# Patient Record
Sex: FEMALE | Race: WHITE | NOT HISPANIC OR LATINO | Employment: STUDENT | URBAN - METROPOLITAN AREA
[De-identification: names, ages, dates, MRNs, and addresses within clinical notes are randomized per-mention and may not be internally consistent; named-entity substitution may affect disease eponyms.]

---
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
## 2021-05-23 ENCOUNTER — OFFICE VISIT (OUTPATIENT)
Dept: URGENT CARE | Facility: CLINIC | Age: 21
End: 2021-05-23
Payer: COMMERCIAL

## 2021-05-23 VITALS
HEART RATE: 71 BPM | SYSTOLIC BLOOD PRESSURE: 117 MMHG | TEMPERATURE: 97.8 F | RESPIRATION RATE: 18 BRPM | HEIGHT: 68 IN | WEIGHT: 168 LBS | DIASTOLIC BLOOD PRESSURE: 66 MMHG | BODY MASS INDEX: 25.46 KG/M2 | OXYGEN SATURATION: 98 %

## 2021-05-23 DIAGNOSIS — R42 VERTIGO: ICD-10-CM

## 2021-05-23 DIAGNOSIS — K21.9 GASTROESOPHAGEAL REFLUX DISEASE, UNSPECIFIED WHETHER ESOPHAGITIS PRESENT: ICD-10-CM

## 2021-05-23 DIAGNOSIS — H69.91 DISORDER OF RIGHT EUSTACHIAN TUBE: ICD-10-CM

## 2021-05-23 DIAGNOSIS — R42 DIZZINESS AND GIDDINESS: Primary | ICD-10-CM

## 2021-05-23 PROCEDURE — 99213 OFFICE O/P EST LOW 20 MIN: CPT | Performed by: NURSE PRACTITIONER

## 2021-05-23 RX ORDER — MECLIZINE HYDROCHLORIDE 25 MG/1
25 TABLET ORAL 3 TIMES DAILY PRN
Qty: 20 TABLET | Refills: 0 | Status: SHIPPED | OUTPATIENT
Start: 2021-05-23

## 2021-05-23 RX ORDER — DROSPIRENONE AND ETHINYL ESTRADIOL 0.02-3(28)
1 KIT ORAL DAILY
COMMUNITY

## 2021-05-23 NOTE — PATIENT INSTRUCTIONS
--Use OTC nasal steroid (Flonase, Nasocort) at bedtime  Consider also 3-days of prednisone  --Rx meclizine as needed for vertigo  Use with caution as may cause tiredness  --Stay well hydrated, change positions slowly  --Avoid alcohol, greasy foods, heavy meals  OTC Prilosec or Pepcid as needed for heartburn  --F/u with PCP for ongoing symptoms  Go to ER if worse

## 2021-05-23 NOTE — PROGRESS NOTES
[facility]        NAME: Mikey Antonio is a 24 y o  female  : 2000    MRN: [id number]  DATE: May 23, 2021  TIME: 1:02 PM    Assessment and Plan   Dizziness and giddiness [R42]  1  Dizziness and giddiness     2  Vertigo  meclizine (ANTIVERT) 25 mg tablet   3  Disorder of right eustachian tube     4  Gastroesophageal reflux disease, unspecified whether esophagitis present       --Suspect labyrinthitis/PV secondary to underlying allergic rhinitis/ETD as the most likely cause of her symptoms  Address per below  Patient Instructions     --Use OTC nasal steroid (Flonase, Nasocort) at bedtime  Consider also 3-days of prednisone (mom has at home)  --Rx meclizine as needed for vertigo  Use with caution as may cause tiredness  --Stay well hydrated, change positions slowly  --Avoid alcohol, greasy foods, heavy meals  OTC Prilosec or Pepcid as needed for heartburn  --F/u with PCP for ongoing symptoms  Go to ER if worse  Chief Complaint     Chief Complaint   Patient presents with    Dizziness     Random vomiting and dizziness x 3 weeks  Rt ear pain started yesterday  [place] teeth coming in  Advil taken  History of Present Illness         Here with mom for complaints of intermittent dizziness, full/"weird" feeling in right ear x 10 days  Dizziness is combination of lightheaded, spinning sensation  Noted more with position changes  Admits to spring allergies  Some sneezing, but no significant nasal congestion at present  No fever, headache  No hearing changes  No weakness, numbness, vision changes, neck pain, falls  No associated nausea, but did have episode of vomiting 4 weeks ago and again 2 days ago, both times  after eating greasy food and consuming single alcoholic beverage  No abdominal pain but does have history of heartburn and hiatal hernia  No nausea at present  Mild dizziness, however  Slated to get wisdom teeth out soon           Review of Systems   Review of Systems   Constitutional: Negative for fever  HENT: Positive for ear pain  Negative for ear discharge, hearing loss and sore throat  Respiratory: Negative for shortness of breath  Cardiovascular: Negative for chest pain  Gastrointestinal: Positive for vomiting  Negative for abdominal pain and nausea  Genitourinary: Negative for difficulty urinating  Musculoskeletal: Negative for neck pain  Skin: Negative for rash  Neurological: Positive for dizziness and light-headedness  Negative for tremors, syncope, speech difficulty, numbness and headaches  Psychiatric/Behavioral: Negative for confusion  Current Medications       Current Outpatient Medications:     drospirenone-ethinyl estradiol (SANJUANITA) 3-0 02 MG per tablet, Take 1 tablet by mouth daily, Disp: , Rfl:     methylphenidate (RITALIN) 20 MG tablet, Take by mouth, Disp: , Rfl:     Doxycycline Hyclate 200 MG TBEC, Take by mouth, Disp: , Rfl:     meclizine (ANTIVERT) 25 mg tablet, Take 1 tablet (25 mg total) by mouth 3 (three) times a day as needed for dizziness, Disp: 20 tablet, Rfl: 0    Current Allergies     Allergies as of 05/23/2021    (No Known Allergies)            The following portions of the patient's history were reviewed and updated as appropriate: allergies, current medications, past family history, past medical history, past social history, past surgical history and problem list      Past Medical History:   Diagnosis Date    Acid reflux        History reviewed  No pertinent surgical history  No family history on file  Medications have been verified  Objective   /66   Pulse 71   Temp 97 8 °F (36 6 °C) (Temporal)   Resp 18   Ht 5' 8" (1 727 m)   Wt 76 2 kg (168 lb)   LMP 05/15/2021   SpO2 98%   BMI 25 54 kg/m²   Patient's last menstrual period was 05/15/2021  Physical Exam     Physical Exam  Constitutional:       Appearance: She is well-developed  HENT:      Head: Normocephalic        Right Ear: Ear canal and external ear normal       Left Ear: Tympanic membrane, ear canal and external ear normal       Ears:      Comments: Right TM dull grey, partially retracted  Nose: Nose normal       Mouth/Throat:      Pharynx: No posterior oropharyngeal erythema  Eyes:      Extraocular Movements: Extraocular movements intact  Conjunctiva/sclera: Conjunctivae normal       Pupils: Pupils are equal, round, and reactive to light  Comments: No nystagmus  Neck:      Musculoskeletal: Normal range of motion and neck supple  Cardiovascular:      Rate and Rhythm: Normal rate and regular rhythm  Heart sounds: Normal heart sounds  Pulmonary:      Effort: Pulmonary effort is normal       Breath sounds: Normal breath sounds  Abdominal:      General: Bowel sounds are normal       Palpations: Abdomen is soft  Tenderness: There is no abdominal tenderness  Musculoskeletal: Normal range of motion  General: No tenderness  Lymphadenopathy:      Cervical: No cervical adenopathy  Skin:     General: Skin is warm and dry  Neurological:      General: No focal deficit present  Mental Status: She is alert and oriented to person, place, and time  Cranial Nerves: No cranial nerve deficit  Sensory: No sensory deficit  Motor: No weakness  Coordination: Coordination normal       Gait: Gait normal       Deep Tendon Reflexes: Reflexes are normal and symmetric  Reflexes normal       Comments: Equivocal [place]  Psychiatric:         Mood and Affect: Mood normal          Behavior: Behavior normal          Thought Content:  Thought content normal          Judgment: Judgment normal

## 2021-06-29 ENCOUNTER — OFFICE VISIT (OUTPATIENT)
Dept: URGENT CARE | Age: 21
End: 2021-06-29
Payer: COMMERCIAL

## 2021-06-29 VITALS
TEMPERATURE: 97.3 F | SYSTOLIC BLOOD PRESSURE: 105 MMHG | RESPIRATION RATE: 18 BRPM | DIASTOLIC BLOOD PRESSURE: 59 MMHG | HEART RATE: 75 BPM | OXYGEN SATURATION: 99 %

## 2021-06-29 DIAGNOSIS — L25.5 DERMATITIS DUE TO PLANTS, INCLUDING POISON IVY, SUMAC, AND OAK: Primary | ICD-10-CM

## 2021-06-29 PROCEDURE — 99213 OFFICE O/P EST LOW 20 MIN: CPT | Performed by: PHYSICIAN ASSISTANT

## 2021-06-29 RX ORDER — PREDNISONE 10 MG/1
TABLET ORAL
Qty: 21 TABLET | Refills: 0 | Status: SHIPPED | OUTPATIENT
Start: 2021-06-29 | End: 2021-07-05

## 2021-06-29 NOTE — PATIENT INSTRUCTIONS
Take all prednisone as prescribed  Do Not take any other anti-inflammatories such as Motrin, Aleve, ibuprofen with the prednisone/steroids  OTC symptomatic treatment for itching if needed  Call PCP to schedule a follow-up appt tomorrow for reevaluation and to ensure resolution of symptoms  Go to the nearest ER for evaluation if any fevers, redness, warmth, discharge, streaking redness, redness that is circumferential, bleeding, pain, signs of infection, new or worsening symptoms, facial/tongue/lip swelling, vision changes, hearing changes, ear drainage, difficulty breathing or swallowing, or other new worsening or concerning symptoms  Poison Ivy   WHAT YOU NEED TO KNOW:   Poison ivy is a plant that can cause an itchy, uncomfortable rash on your skin  Poison ivy grows as a shrub or vine in woods, fields, and areas of thick [place]  It has 3 bright green leaves on each stem that turn red in zhao  DISCHARGE INSTRUCTIONS:   Medicines:   · Antiseptic or drying creams or ointments: These medicines may be used to dry out the rash and decrease the itching  These products may be available without a doctor's order  · Steroids: This medicine helps decrease itching and inflammation  It can be given as a cream to apply to your skin or as a pill  · Antihistamines: This medicine may help decrease itching and help you sleep  It is available without a doctor's order  · Take your medicine as directed  Contact your healthcare provider if you think your medicine is not helping or if you have side effects  Tell him of her if you are allergic to any medicine  Keep a list of the medicines, vitamins, and herbs you take  Include the amounts, and when and why you take them  Bring the list or the pill bottles to follow-up visits  Carry your medicine list with you in case of an emergency      Follow up with your healthcare provider as directed:  Write down your questions so you remember to ask them during your visits  How your poison ivy rash spreads: You cannot spread poison ivy by touching your rash or the liquid from your blisters  Poison ivy is spread only if you scratch your skin while it still has oil on it  You may think your rash is spreading because new rashes appear over a number of days  This happens because areas covered by thin skin break out in a rash first  Your face or forearms may develop a rash before thicker areas, such as the palms of your hands  Self-care:   · Keep your rash clean and dry:  Wash it with soap and water  Gently pat it dry with a clean towel  · Try not to scratch or rub your rash: This can cause your skin to become infected  · Use a compress on your rash:  Dip a clean washcloth in cool water  Wring it out and place it on your rash  Leave the washcloth on your skin for 15 minutes  Do this at least 3 times per day  · Take a cornstarch or oatmeal bath: If your rash is too large to cover with wet washcloths, take 3 or 4 cornstarch baths daily  Mix 1 pound of cornstarch with a little water to make a paste  Add the paste to a tub full of water and mix well  You may also use colloidal oatmeal in the bath water  Use lukewarm water  Avoid hot water because it may cause your itching to increase  Prevent a poison ivy rash in the future:   · Wear skin protection:  Wear long pants, a long-sleeved shirt, and gloves  Use a skin block lotion to protect your skin from poison ivy oil  You can find this at a drugstore without a prescription  · Wash clothing after possible exposure: If you think you have been near a poison ivy plant, wash the clothes you were wearing separately from other clothes  Rinse the washing machine well after you take the clothes out  Scrub boots and shoes with warm, soapy water  Dry clean items and clothing that you cannot wash in water  Poison ivy oil is sticky and can stay on surfaces for a long time  It can cause a new rash even years later  · Bathe your pet:  Use warm water and shampoo on your pet's fur  This will prevent the spread of oil to your skin, car, and home  Wear long sleeves, long pants, and gloves while washing pets or any items that may have oil on them  · Reduce exposure to poison ivy:  Do not touch plants that look like poison ivy  Keep your yard free of poison ivy  While protecting your skin, remove the plant and the roots  Place them in a plastic bag and seal the bag tightly  · Do not burn poison ivy plants: This can spread the oil through the air  If you breathe the oil into your lungs, you could have swelling and serious breathing problems  Oil that clings to the fire wilder can land on your skin and cause a rash  Contact your healthcare provider if:   · You have pus, soft yellow scabs, or tenderness on the rash  · The itching gets worse or keeps you awake at night  · The rash covers more than 1/4 of your skin or spreads to your eyes, mouth, or genital area  · The rash is not better after 2 to 3 weeks  · You have tender, swollen glands on the sides of your neck  · You have swelling in your arms and legs  · You have questions or concerns about your condition or care  Return to the emergency department if:   · You have a fever  · You have redness, swelling, and tenderness around the rash  · You have trouble breathing  © Copyright [address] Information is for End User's use only and may not be sold, redistributed or otherwise used for commercial purposes  All illustrations and images included in CareNotes® are the copyrighted property of A D A M , Inc  or [facility]   The above information is an  only  It is not intended as medical advice for individual conditions or treatments  Talk to your doctor, nurse or pharmacist before following any medical regimen to see if it is safe and effective for you

## 2021-06-29 NOTE — PROGRESS NOTES
[facility]        NAME: Damian Rowell is a 24 y o  female  : 2000    MRN: [id number]  DATE: 2021  TIME: 8:39 AM    Assessment and Plan   Dermatitis due to plants, including poison ivy, sumac, and oak [L25 5]  1  Dermatitis due to plants, including poison ivy, sumac, and oak  predniSONE 10 mg tablet         Patient Instructions     Take all prednisone as prescribed  Do Not take any other anti-inflammatories such as Motrin, Aleve, ibuprofen with the prednisone/steroids  OTC symptomatic treatment for itching if needed  Call PCP to schedule a follow-up appt tomorrow for reevaluation and to ensure resolution of symptoms  Go to the nearest ER for evaluation if any fevers, redness, warmth, discharge, streaking redness, redness that is circumferential, bleeding, pain, signs of infection, new or worsening symptoms, facial/tongue/lip swelling, vision changes, hearing changes, ear drainage, difficulty breathing or swallowing, or other new worsening or concerning symptoms  Chief Complaint     Chief Complaint   Patient presents with   [address]     face x yesterday         History of Present Illness        25-year-old female presents with poison ivy rash on left thigh, right forehead, bilateral cheeks since yesterday  States on Saturday and  she was at her boyfriend's house and they were outside and they noticed some poison ivy  It seems like her previous poison ivy rashes in the past   States she tried the over-the-counter topical creams with no relief  States her boyfriend has been on prednisone in the past for a similar rash from poison ivy  She denies any fevers, cough/ cold symptoms, drainage, warmth, difficulty breathing or swallowing,  eye pain, vision changes, ear drainage, hearing changes, facial swelling, tongue swelling, chest pain, shortness of breath, GI/ symptoms or other complaints    She denies any other new lotions, foods, detergents, soaps, bug bites or other inciting factors  She denies any pregnancy risk  Review of Systems   Review of Systems   Constitutional: Negative for chills, fatigue and fever  HENT: Negative for congestion, ear discharge, ear pain, facial swelling, hearing loss, mouth sores, sinus pressure, sore throat, trouble swallowing and voice change  Respiratory: Negative for cough, chest tightness, shortness of breath and wheezing  Cardiovascular: Negative for chest pain  Gastrointestinal: Negative for abdominal pain, nausea and vomiting  Musculoskeletal: Negative for arthralgias, myalgias and neck pain  Skin: Positive for rash  Neurological: Negative for dizziness, syncope, weakness, numbness and headaches  All other systems reviewed and are negative          Current Medications       Current Outpatient Medications:     drospirenone-ethinyl estradiol (SANJUANITA) 3-0 02 MG per tablet, Take 1 tablet by mouth daily, Disp: , Rfl:     Doxycycline Hyclate 200 MG TBEC, Take by mouth (Patient not taking: Reported on 6/29/2021), Disp: , Rfl:     meclizine (ANTIVERT) 25 mg tablet, Take 1 tablet (25 mg total) by mouth 3 (three) times a day as needed for dizziness (Patient not taking: Reported on 6/29/2021), Disp: 20 tablet, Rfl: 0    methylphenidate (RITALIN) 20 MG tablet, Take by mouth (Patient not taking: Reported on 6/29/2021), Disp: , Rfl:     predniSONE 10 mg tablet, Take 6 tablets (60 mg total) by mouth daily for 1 day, THEN 5 tablets (50 mg total) daily for 1 day, THEN 4 tablets (40 mg total) daily for 1 day, THEN 3 tablets (30 mg total) daily for 1 day, THEN 2 tablets (20 mg total) daily for 1 day, THEN 1 tablet (10 mg total) daily for 1 day , Disp: 21 tablet, Rfl: 0    Current Allergies     Allergies as of 06/29/2021    (No Known Allergies)            The following portions of the patient's history were reviewed and updated as appropriate: allergies, current medications, past family history, past medical history, past social history, past surgical history and problem list      Past Medical History:   Diagnosis Date    Acid reflux        History reviewed  No pertinent surgical history  No family history on file  Medications have been verified  Objective   /59   Pulse 75   Temp (!) 97 3 °F (36 3 °C)   Resp 18   LMP 06/08/2021   SpO2 99%        Physical Exam     Physical Exam  Vitals and nursing note reviewed  Constitutional:       General: She is not in acute distress  Appearance: She is well-developed  HENT:      Head:        Right Ear: Hearing, tympanic membrane, ear canal and external ear normal       Left Ear: Hearing, tympanic membrane, ear canal and external ear normal       Mouth/Throat:      Mouth: Mucous membranes are moist  No oral lesions or angioedema  Pharynx: Oropharynx is clear  Uvula midline  No pharyngeal swelling, posterior oropharyngeal erythema or uvula swelling  Eyes:      Extraocular Movements: Extraocular movements intact  Conjunctiva/sclera: Conjunctivae normal       Pupils: Pupils are equal, round, and reactive to light  Cardiovascular:      Rate and Rhythm: Normal rate and regular rhythm  Heart sounds: Normal heart sounds  Pulmonary:      Effort: Pulmonary effort is normal  No respiratory distress  Breath sounds: Normal breath sounds  No wheezing  Skin:     Capillary Refill: Capillary refill takes less than 2 seconds  Findings: Rash ( blanchable mildly erythematous rash to right forehead, bilateral cheeks, left thigh consistent with poison ivy /oak dermatitis rash  No pustules, ulcerations  No drainage or warmth  NTTP  No swelling  No sign of secondary infection or cellulitis) present  Neurological:      Mental Status: She is alert and oriented to person, place, and time     Psychiatric:         Behavior: Behavior normal

## 2022-04-22 ENCOUNTER — APPOINTMENT (OUTPATIENT)
Dept: LAB | Facility: MEDICAL CENTER | Age: 22
End: 2022-04-22

## 2022-08-08 ENCOUNTER — ANESTHESIA EVENT (OUTPATIENT)
Dept: PERIOP | Facility: HOSPITAL | Age: 22
End: 2022-08-08
Payer: SELF-PAY

## 2022-08-11 NOTE — PRE-PROCEDURE INSTRUCTIONS
Pre-Surgery Instructions:   Medication Instructions    drospirenone-ethinyl estradiol (SANJUANITA) 3-0 02 MG per tablet Take per normal schedule     Pre op,medications and showering instructions reviewed-Patient has hibiclens  Instructed to avoid all ASA/NSAIDs and OTC Vit/Supp from now until after surgery per anesthesia guidelines  Tylenol ok prn  Pt  Verbalized an understanding of all instructions reviewed and offers no concerns at this time

## 2022-08-15 ENCOUNTER — ANESTHESIA (OUTPATIENT)
Dept: PERIOP | Facility: HOSPITAL | Age: 22
End: 2022-08-15
Payer: SELF-PAY

## 2022-08-15 ENCOUNTER — HOSPITAL ENCOUNTER (OUTPATIENT)
Facility: HOSPITAL | Age: 22
Setting detail: OUTPATIENT SURGERY
Discharge: HOME | End: 2022-08-15
Attending: SURGERY | Admitting: SURGERY
Payer: SELF-PAY

## 2022-08-15 VITALS
RESPIRATION RATE: 18 BRPM | DIASTOLIC BLOOD PRESSURE: 76 MMHG | BODY MASS INDEX: 28.79 KG/M2 | HEIGHT: 68 IN | SYSTOLIC BLOOD PRESSURE: 129 MMHG | TEMPERATURE: 96.9 F | WEIGHT: 190 LBS | OXYGEN SATURATION: 95 % | HEART RATE: 80 BPM

## 2022-08-15 LAB
EXT PREGNANCY TEST URINE: NEGATIVE
EXT. CONTROL: NORMAL

## 2022-08-15 PROCEDURE — 81025 URINE PREGNANCY TEST: CPT | Performed by: SURGERY

## 2022-08-15 PROCEDURE — L8600 IMPLANT BREAST SILICONE/EQ: HCPCS | Performed by: SURGERY

## 2022-08-15 DEVICE — SMOOTH ROUND MODERATE PLUS PROFILE GEL-FILLED BREAST IMPLANT, 350CC  SMOOTH ROUND SILICONE
Type: IMPLANTABLE DEVICE | Site: BREAST | Status: FUNCTIONAL
Brand: MENTOR MEMORYGEL BREAST IMPLANT

## 2022-08-15 RX ORDER — ONDANSETRON 2 MG/ML
4 INJECTION INTRAMUSCULAR; INTRAVENOUS ONCE AS NEEDED
Status: DISCONTINUED | OUTPATIENT
Start: 2022-08-15 | End: 2022-08-15 | Stop reason: HOSPADM

## 2022-08-15 RX ORDER — PROPOFOL 10 MG/ML
INJECTION, EMULSION INTRAVENOUS AS NEEDED
Status: DISCONTINUED | OUTPATIENT
Start: 2022-08-15 | End: 2022-08-15

## 2022-08-15 RX ORDER — FENTANYL CITRATE 50 UG/ML
INJECTION, SOLUTION INTRAMUSCULAR; INTRAVENOUS AS NEEDED
Status: DISCONTINUED | OUTPATIENT
Start: 2022-08-15 | End: 2022-08-15

## 2022-08-15 RX ORDER — SODIUM CHLORIDE, SODIUM LACTATE, POTASSIUM CHLORIDE, CALCIUM CHLORIDE 600; 310; 30; 20 MG/100ML; MG/100ML; MG/100ML; MG/100ML
125 INJECTION, SOLUTION INTRAVENOUS CONTINUOUS
Status: DISCONTINUED | OUTPATIENT
Start: 2022-08-15 | End: 2022-08-15 | Stop reason: HOSPADM

## 2022-08-15 RX ORDER — HYDROMORPHONE HCL/PF 1 MG/ML
SYRINGE (ML) INJECTION AS NEEDED
Status: DISCONTINUED | OUTPATIENT
Start: 2022-08-15 | End: 2022-08-15

## 2022-08-15 RX ORDER — MIDAZOLAM HYDROCHLORIDE 2 MG/2ML
INJECTION, SOLUTION INTRAMUSCULAR; INTRAVENOUS AS NEEDED
Status: DISCONTINUED | OUTPATIENT
Start: 2022-08-15 | End: 2022-08-15

## 2022-08-15 RX ORDER — HYDROCODONE BITARTRATE AND ACETAMINOPHEN 5; 325 MG/1; MG/1
1 TABLET ORAL EVERY 4 HOURS PRN
Status: DISCONTINUED | OUTPATIENT
Start: 2022-08-15 | End: 2022-08-15 | Stop reason: HOSPADM

## 2022-08-15 RX ORDER — HYDROMORPHONE HCL IN WATER/PF 6 MG/30 ML
0.2 PATIENT CONTROLLED ANALGESIA SYRINGE INTRAVENOUS
Status: DISCONTINUED | OUTPATIENT
Start: 2022-08-15 | End: 2022-08-15 | Stop reason: HOSPADM

## 2022-08-15 RX ORDER — GLYCOPYRROLATE 0.2 MG/ML
INJECTION INTRAMUSCULAR; INTRAVENOUS AS NEEDED
Status: DISCONTINUED | OUTPATIENT
Start: 2022-08-15 | End: 2022-08-15

## 2022-08-15 RX ORDER — LIDOCAINE HYDROCHLORIDE 10 MG/ML
INJECTION, SOLUTION EPIDURAL; INFILTRATION; INTRACAUDAL; PERINEURAL AS NEEDED
Status: DISCONTINUED | OUTPATIENT
Start: 2022-08-15 | End: 2022-08-15

## 2022-08-15 RX ORDER — MAGNESIUM HYDROXIDE 1200 MG/15ML
LIQUID ORAL AS NEEDED
Status: DISCONTINUED | OUTPATIENT
Start: 2022-08-15 | End: 2022-08-15 | Stop reason: HOSPADM

## 2022-08-15 RX ORDER — DEXAMETHASONE SODIUM PHOSPHATE 10 MG/ML
INJECTION, SOLUTION INTRAMUSCULAR; INTRAVENOUS AS NEEDED
Status: DISCONTINUED | OUTPATIENT
Start: 2022-08-15 | End: 2022-08-15

## 2022-08-15 RX ORDER — CEFAZOLIN SODIUM 2 G/50ML
2000 SOLUTION INTRAVENOUS ONCE
Status: COMPLETED | OUTPATIENT
Start: 2022-08-15 | End: 2022-08-15

## 2022-08-15 RX ORDER — FENTANYL CITRATE/PF 50 MCG/ML
50 SYRINGE (ML) INJECTION
Status: COMPLETED | OUTPATIENT
Start: 2022-08-15 | End: 2022-08-15

## 2022-08-15 RX ORDER — ROCURONIUM BROMIDE 10 MG/ML
INJECTION, SOLUTION INTRAVENOUS AS NEEDED
Status: DISCONTINUED | OUTPATIENT
Start: 2022-08-15 | End: 2022-08-15

## 2022-08-15 RX ORDER — ONDANSETRON 2 MG/ML
INJECTION INTRAMUSCULAR; INTRAVENOUS AS NEEDED
Status: DISCONTINUED | OUTPATIENT
Start: 2022-08-15 | End: 2022-08-15

## 2022-08-15 RX ORDER — ACETAMINOPHEN 325 MG/1
650 TABLET ORAL EVERY 6 HOURS PRN
Status: DISCONTINUED | OUTPATIENT
Start: 2022-08-15 | End: 2022-08-15 | Stop reason: HOSPADM

## 2022-08-15 RX ORDER — NEOSTIGMINE METHYLSULFATE 1 MG/ML
INJECTION INTRAVENOUS AS NEEDED
Status: DISCONTINUED | OUTPATIENT
Start: 2022-08-15 | End: 2022-08-15

## 2022-08-15 RX ADMIN — NEOSTIGMINE METHYLSULFATE 3 MG: 1 INJECTION INTRAVENOUS at 11:42

## 2022-08-15 RX ADMIN — FENTANYL CITRATE 50 MCG: 50 INJECTION, SOLUTION INTRAMUSCULAR; INTRAVENOUS at 12:33

## 2022-08-15 RX ADMIN — HYDROMORPHONE HYDROCHLORIDE 0.25 MG: 1 INJECTION, SOLUTION INTRAMUSCULAR; INTRAVENOUS; SUBCUTANEOUS at 10:22

## 2022-08-15 RX ADMIN — DEXAMETHASONE SODIUM PHOSPHATE 10 MG: 10 INJECTION INTRAMUSCULAR; INTRAVENOUS at 10:03

## 2022-08-15 RX ADMIN — HYDROCODONE BITARTRATE AND ACETAMINOPHEN 1 TABLET: 5; 325 TABLET ORAL at 13:42

## 2022-08-15 RX ADMIN — MIDAZOLAM HYDROCHLORIDE 2 MG: 1 INJECTION, SOLUTION INTRAMUSCULAR; INTRAVENOUS at 09:56

## 2022-08-15 RX ADMIN — GLYCOPYRROLATE 0.4 MG: 0.2 INJECTION, SOLUTION INTRAMUSCULAR; INTRAVENOUS at 11:42

## 2022-08-15 RX ADMIN — ROCURONIUM BROMIDE 10 MG: 10 INJECTION, SOLUTION INTRAVENOUS at 10:34

## 2022-08-15 RX ADMIN — PROPOFOL 200 MG: 10 INJECTION, EMULSION INTRAVENOUS at 09:59

## 2022-08-15 RX ADMIN — LIDOCAINE HYDROCHLORIDE 50 MG: 10 INJECTION, SOLUTION EPIDURAL; INFILTRATION; INTRACAUDAL; PERINEURAL at 09:59

## 2022-08-15 RX ADMIN — FENTANYL CITRATE 50 MCG: 50 INJECTION, SOLUTION INTRAMUSCULAR; INTRAVENOUS at 12:10

## 2022-08-15 RX ADMIN — ROCURONIUM BROMIDE 50 MG: 10 INJECTION, SOLUTION INTRAVENOUS at 09:59

## 2022-08-15 RX ADMIN — SODIUM CHLORIDE, SODIUM LACTATE, POTASSIUM CHLORIDE, AND CALCIUM CHLORIDE: .6; .31; .03; .02 INJECTION, SOLUTION INTRAVENOUS at 09:56

## 2022-08-15 RX ADMIN — FENTANYL CITRATE 50 MCG: 50 INJECTION, SOLUTION INTRAMUSCULAR; INTRAVENOUS at 12:25

## 2022-08-15 RX ADMIN — FENTANYL CITRATE 50 MCG: 50 INJECTION, SOLUTION INTRAMUSCULAR; INTRAVENOUS at 10:50

## 2022-08-15 RX ADMIN — FENTANYL CITRATE 50 MCG: 50 INJECTION, SOLUTION INTRAMUSCULAR; INTRAVENOUS at 10:09

## 2022-08-15 RX ADMIN — CEFAZOLIN SODIUM 2000 MG: 2 SOLUTION INTRAVENOUS at 10:05

## 2022-08-15 RX ADMIN — ONDANSETRON 4 MG: 2 INJECTION INTRAMUSCULAR; INTRAVENOUS at 10:44

## 2022-08-15 RX ADMIN — FENTANYL CITRATE 50 MCG: 50 INJECTION, SOLUTION INTRAMUSCULAR; INTRAVENOUS at 09:59

## 2022-08-15 RX ADMIN — HYDROMORPHONE HYDROCHLORIDE 0.25 MG: 1 INJECTION, SOLUTION INTRAMUSCULAR; INTRAVENOUS; SUBCUTANEOUS at 10:13

## 2022-08-15 RX ADMIN — ROCURONIUM BROMIDE 10 MG: 10 INJECTION, SOLUTION INTRAVENOUS at 10:51

## 2022-08-15 RX ADMIN — HYDROMORPHONE HYDROCHLORIDE 0.2 MG: 0.2 INJECTION, SOLUTION INTRAMUSCULAR; INTRAVENOUS; SUBCUTANEOUS at 12:45

## 2022-08-15 RX ADMIN — FENTANYL CITRATE 50 MCG: 50 INJECTION, SOLUTION INTRAMUSCULAR; INTRAVENOUS at 12:05

## 2022-08-15 NOTE — OP NOTE
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]    Procedure(s) (LRB):  MASTOPEXY (Bilateral)  AUGMENTATION BREAST (Right)    Specimen(s):  * No specimens in log *    Estimated Blood Loss:   Minimal    Drains:  * No LDAs found *    Anesthesia Type:   General    Operative Indications: 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]    Operative Findings:      Complications:   None    Procedure and Technique:  Patient identified correctly on the table  Prepped and draped in sterile surgical fashion  The breasts were injected with 10 cc of 0 25% Marcaine with epinephrine  Patient preoperatively was marked in a Wise pattern  Nipple-areolar complex was circumscribed with a 38 mm cookie cutter  The entire Flynn pattern was then de-epithelialized  We then elevated bilateral medial and lateral breast skin flaps  Obtained meticulous hemostasis  On the right side the inframmary incision was deepened down to the pectoralis fascia  This was incised and elevated off the muscle out to the marks made  The pocket was injected with local and packed  The packing was removed and the pocket was irrigated with betadine and saline  A 350 cc profile plus gel implant was readied  It was soaked in betadine and then placed in the pocket  Once positioned it was the pocket was closed with 2-0 vicryl  We then elevated the nipple and closed our incision with deep 2-0 Vicryl 3-0 PDS running subcuticular 3-0 Monocryl stitch and Dermabond dressing  The same procedure was done to both left and right breast which will be dictated once    We had good symmetry after the procedure  Nipple was pink  Patient was placed in a surgical bra  Patient was extubated and taken to recovery room in stable condition  All counts correct x2     I was present for the entire procedure    Patient Disposition:  PACU       SIGNATURE: Zeenat Kapadia MD  DATE: August 15, 2022  TIME: 11:38 AM

## 2022-08-15 NOTE — ANESTHESIA POSTPROCEDURE EVALUATION
Post-Op Assessment Note    CV Status:  Stable    Pain management: satisfactory to patient     Mental Status:  Alert and awake   Hydration Status:  Stable   PONV Controlled:  None   Airway Patency:  Patent  Airway: intubated      Post Op Vitals Reviewed: Yes      Staff: CRNA         No complications documented      /85 (08/15/22 1203)    Temp 97 9 °F (36 6 °C) (08/15/22 1203)    Pulse 90 (08/15/22 1203)   Resp 14 (08/15/22 1203)    SpO2 100 % (08/15/22 1203)

## 2022-08-15 NOTE — ANESTHESIA PREPROCEDURE EVALUATION
Procedure:  MASTOPEXY (Bilateral Breast)  AUGMENTATION BREAST (Right Breast)    Relevant Problems   No relevant active problems      Acid reflux GERD (gastroesophageal reflux disease)       Physical Exam    Airway    Mallampati score: II  TM Distance: <3 FB  Neck ROM: full     Dental   No notable dental hx     Cardiovascular  Cardiovascular exam normal    Pulmonary  Pulmonary exam normal     Other Findings        Anesthesia Plan  ASA Score- 2     Anesthesia Type- general with ASA Monitors  Additional Monitors:   Airway Plan: ETT  Plan Factors-Exercise tolerance (METS): >4 METS  Chart reviewed  Existing labs reviewed  Patient is not a current smoker  Patient not instructed to abstain from smoking on day of procedure  Patient did not smoke on day of surgery  Induction- intravenous  Postoperative Plan-     Informed Consent- Anesthetic plan and risks discussed with patient  I personally reviewed this patient with the CRNA  Discussed and agreed on the Anesthesia Plan with the CRNA  Nadia Moise

## 2022-08-15 NOTE — DISCHARGE SUMMARY
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]  Principal Procedure:   MASTOPEXY (Bilateral Breast)  AUGMENTATION BREAST (Right Breast)  Discharge Medications:  See after visit summary for reconciled discharge medications provided to patient and family  Reason for Admission:  Komal Valera was electively admitted to undergo the above named procedure on 8/15/2022 as an outpatient  Hospital Course:  Patient underwent the above named procedure on the day of admission without complications  They were discharged home the same day  Disposition:  To home in care of self and family    Condition:  Good  Follow up:  Patient with follow up in the office with Dr Mandie Johns MD in 12 day(s) or as scheduled per his office  Mandie Johns MD  8/15/2022 11:46 AM

## 2022-08-15 NOTE — INTERVAL H&P NOTE
H&P reviewed  After examining the patient I find no changes in the patients condition since the H&P had been written      Vitals:    08/15/22 0901   BP: 119/76   Pulse: 76   Resp: 20   Temp: 98 4 °F (36 9 °C)   SpO2: 96%

## 2023-07-25 ENCOUNTER — OFFICE VISIT (OUTPATIENT)
Dept: ENDOCRINOLOGY | Facility: HOSPITAL | Age: 23
End: 2023-07-25
Payer: COMMERCIAL

## 2023-07-25 VITALS
HEIGHT: 68 IN | WEIGHT: 193 LBS | SYSTOLIC BLOOD PRESSURE: 102 MMHG | BODY MASS INDEX: 29.25 KG/M2 | DIASTOLIC BLOOD PRESSURE: 68 MMHG | HEART RATE: 91 BPM

## 2023-07-25 DIAGNOSIS — E16.1 HYPERINSULINEMIA: ICD-10-CM

## 2023-07-25 DIAGNOSIS — R63.5 WEIGHT GAIN: Primary | ICD-10-CM

## 2023-07-25 PROCEDURE — 99244 OFF/OP CNSLTJ NEW/EST MOD 40: CPT | Performed by: STUDENT IN AN ORGANIZED HEALTH CARE EDUCATION/TRAINING PROGRAM

## 2023-07-25 RX ORDER — METHYLPHENIDATE HYDROCHLORIDE 18 MG/1
18 TABLET ORAL AS NEEDED
COMMUNITY

## 2023-07-25 RX ORDER — FLUOXETINE 10 MG/1
10 CAPSULE ORAL DAILY
COMMUNITY
Start: 2023-07-12

## 2023-07-25 RX ORDER — MULTIVITAMIN
1 CAPSULE ORAL DAILY
COMMUNITY

## 2023-07-25 NOTE — PROGRESS NOTES
Ray Escobedo 26 y.o. female MRN: [id number]    Encounter: [id number]      Assessment/Plan     Assessment: This is a 26y.o.-year-old female with high insulin, normal glucose and A1c. PCP tested insulin due to weight loss difficulty. PCP put her on metformin due to increased insulin level, 500mg BID with significant GI issues. No symptoms of hyperandrogenism. High insulin level due to eating before test, given this test was not fasting it is a normal test result. Lack of weight loss likely secondary to increasing muscle content from weightlifting. Plan:  -labs: fasting insulin, fasting glucose  - 17- hydroxyprogesterone, androstenedione, DHEA sulfate and testosterone to evaluate PCOS  -discontinue metformin    CC: Diabetes    History of Present Illness     HPI:  Anabel Mercado is a 26year old female with a past medical history of ADHD who presents to the office for hyperinsulinemia. PCP checked insulin level because the patient had difficulty loosing weight and told patient she did not need to fast for this test. She ate 30 minutes before the insulin test and it came back elevated at 74.6. Metformin 500mg BID was started at this time. Two days later she got additional labs for fasting glucose level (80) and A1c (5.3%) both WNL. She is experiencing significant abdominal pain and nausea from the metformin, last dose she took was this morning. She denies symptoms of hypo or hyperglycemia. No family history of diabetes. Periods   Menarche: 8th grade   Frequency: none since IUD, light spotting, prior to IUD normal periods   Flow normal  Birth control: on marcellus IUD since 8/2022, prior was on OCP (Paulina)   Pregnancies: none   Deliveries: none   Concerns for infertility: none   Hyperandrogenism (acne, voice deepening, excess hair growth, clitoromegaly): denies all symptoms except acne. Spironolactone did not help acne, OCPs did, IUD initially exacerbated the acne but has since gotten better.     Pelvic US: none Hypoglycemia episodes: none   Hyperglycemia symptoms: none   Diet: calorie restriction and sugar free   Activity: every day for 6 months doing heavy lifting, some cardio on [employer]     History hyperlipidemia: none on labs from 2019  History hypertension: none  Last A1c: 5.3%    Alcohol: socially on weekend   Smoking: none   Other Drugs: none     Family History: thyroid cancer grandmother and hashimoto's thyroiditis and hypertension in mother       Review of Systems   Respiratory: Negative for chest tightness. Cardiovascular: Negative for chest pain. Gastrointestinal: Positive for abdominal pain and constipation. Genitourinary: Negative for menstrual problem. Neurological: Negative for dizziness, syncope, light-headedness and headaches. Historical Information   Past Medical History:   Diagnosis Date   • Acid reflux    • GERD (gastroesophageal reflux disease)      Past Surgical History:   Procedure Laterality Date   • AUGMENTATION BREAST Right 8/15/2022    Procedure: AUGMENTATION BREAST;  Surgeon: Chase Quevedo MD;  Location: [address] [place] [address];  Service: Plastics   • IA MASTOPEXY Bilateral 8/15/2022    Procedure: MASTOPEXY;  Surgeon: Chase Quevedo MD;  Location: [address] [place] [address];  Service: Plastics   • WISDOM TOOTH EXTRACTION       Social History   Social History     Substance and Sexual Activity   Alcohol Use Yes    Comment: social     Social History     Substance and Sexual Activity   Drug Use Not on file     Social History     Tobacco Use   Smoking Status Never   Smokeless Tobacco Never     Family History: History reviewed. No pertinent family history.     Meds/Allergies   Current Outpatient Medications   Medication Sig Dispense Refill   • [employer] FIBER GUMMIES PO Take by mouth     • FLUoxetine (PROzac) 10 mg capsule Take 10 mg by mouth daily     • methylphenidate (CONCERTA) 18 mg ER tablet Take 18 mg by mouth if needed     • Multiple Vitamin (multivitamin) capsule Take 1 capsule by mouth daily       No current facility-administered medications for this visit. No Known Allergies    Objective   Vitals: Blood pressure 102/68, pulse 91, height 5' 8" (1.727 m), weight 87.5 kg (193 lb). Physical Exam  Constitutional:       General: She is awake. She is not in acute distress. Appearance: Normal appearance. She is well-developed and overweight. She is not ill-appearing, toxic-appearing or diaphoretic. HENT:      Right Ear: External ear normal.      Left Ear: External ear normal.      Nose: Nose normal.      Mouth/Throat:      Mouth: Mucous membranes are moist.   Eyes:      General: No scleral icterus. Right eye: No discharge. Left eye: No discharge. Extraocular Movements: Extraocular movements intact. Conjunctiva/sclera: Conjunctivae normal.      Pupils: Pupils are equal, round, and reactive to light. Cardiovascular:      Rate and Rhythm: Normal rate and regular rhythm. Heart sounds: Normal heart sounds, S1 normal and S2 normal. Heart sounds not distant. No murmur heard. No friction rub. No S3 or S4 sounds. Pulmonary:      Effort: Pulmonary effort is normal. No tachypnea, bradypnea, accessory muscle usage, prolonged expiration or respiratory distress. Breath sounds: Normal breath sounds and air entry. No stridor, decreased air movement or transmitted upper airway sounds. Skin:     General: Skin is warm. Coloration: Skin is not ashen, cyanotic, jaundiced, mottled, pale or sallow. Neurological:      Mental Status: She is alert and oriented to person, place, and time. Gait: Gait normal.   Psychiatric:         Attention and Perception: Attention and perception normal.         Mood and Affect: Mood and affect normal.         Speech: Speech normal.         Behavior: Behavior normal. Behavior is cooperative. Thought Content:  Thought content normal.         Cognition and Memory: Cognition and memory normal.         Judgment: Judgment normal.         The history was obtained from the review of the chart, patient. Lab Results:   Lab Results   Component Value Date/Time    Hemoglobin A1C 5.3 06/29/2023 08:18 AM           Imaging Studies: I have personally reviewed pertinent reports. Portions of the record may have been created with voice recognition software. Occasional wrong word or "sound a like" substitutions may have occurred due to the inherent limitations of voice recognition software. Read the chart carefully and recognize, using context, where substitutions have occurred.

## 2023-07-25 NOTE — PROGRESS NOTES
7/25/2023    Assessment/Plan        Problem List Items Addressed This Visit    None  Visit Diagnoses     Weight gain    -  Primary    Relevant Orders    Testosterone, free, total    Androstenedione- Lab Collect    17-Hydroxyprogesterone- Lab Collect    DHEA-sulfate- Lab Collect    Hyperinsulinemia        Relevant Orders    Testosterone, free, total    Androstenedione- Lab Collect    17-Hydroxyprogesterone- Lab Collect    DHEA-sulfate- Lab Collect    US abdomen and pelvis with transvaginal          Assessment/Plan:  Patient is a 23yF with No PMHx who presents today to discuss issues with inability to lose weight and hyperinsulinemia and concern for PCOS. 1) Weight issues:- workup thus far neg for thyroid or cortisol issues. Has not been gaining weight but difficulty losing weight. Discussed given anaerobic exercise most likely converting fat into muscle leading to stable weight. 2) Hyperinsulinemia:- Discussed should have had insulin levels checked when fasting rather than PP since levels will be high then. Given BG and HbA1C normal, truly doubt has severe hyperinsulinemia with levels in 70's. Discussed weight gain can cause hyperinsulinemia and hyperinsulinemia can also make weight loss more difficult. Does not have irregular menses but does have some acne issues- does not have any positive criteria yet to diagnose PCOS. Will obtain androgen workup and if abnormal will consider US pelvis to see if have polycystic ovaries. If labs normal, no US needed as low risk for PCOS specially if insulin levels normal on repeat. Given GI issues, stop metformin for now. Can consider GLP-1 agonist in future for weight loss and hyperinsulinemia if seen on repeat fasting BG.      RTC in 3 months for now if labs abnormal. If normal, does not need endocrine follow up if insulin levels normal    CC: Hyperinsulinemia     History of Present Illness     HPI: Faiza Hair is a 26y.o. year old female with history of weight issues who was referred to us d/t having issues with difficulty losing weight despite diet and exercise with workup done revealing hyperinsulinemia. Patient was tried on metformin by PCP. Patient reports insulin level was checked 30 mins after a meal since she was told fasting was not needed to check insulin level. Patient then had a follow up HbA1C and BG checked 2 days later was was normal.     Patient reports feeling nausea since starting metformin and unable to go to gym because of this. Weight History:- reports not gaining any weight but having difficulty losing weight. Exercise- does mostly weight lifting and minimal cardio  Diet- high protein, low carb low fat diet  Menses- has IUD sine 08/22 but reports menses were regular prior to this, has OCP in the past and menses regular on this and prior to it  Hyperandrogenism- had issues with acne which got better with OCP use, but since switching to IUD have returned. Saw derm and tired on aldactone which did not help either. Denies any hirsutism, clitromegaly or change in voice  No US of pelvis done before. Workup thus far:- TSH, free t4, cortisol all WNL. BG 80, HbA1C 5.3%. 06/23    Social Hx:- does not smoke, occasional alcohol use, no other drugs, currently nursing student with [address] Hx:- none    Review of Systems   Constitutional: Negative for fatigue and unexpected weight change. HENT: Negative for trouble swallowing and voice change. Eyes: Negative for photophobia and visual disturbance. Respiratory: Negative for choking and shortness of breath. Gastrointestinal: Negative for constipation and diarrhea. Endocrine: Negative for cold intolerance and heat intolerance. Musculoskeletal: Negative for arthralgias and myalgias. Skin: Negative for rash.        Historical Information   Past Medical History:   Diagnosis Date   • Acid reflux    • GERD (gastroesophageal reflux disease)      Past Surgical History:   Procedure Laterality Date   • AUGMENTATION BREAST Right 8/15/2022    Procedure: AUGMENTATION BREAST;  Surgeon: Myke Torres MD;  Location: [address] [place] [address] OR;  Service: Plastics   • OR MASTOPEXY Bilateral 8/15/2022    Procedure: MASTOPEXY;  Surgeon: Myke Torres MD;  Location: [address] [place] [address] OR;  Service: Plastics   • WISDOM TOOTH EXTRACTION       Social History   Social History     Substance and Sexual Activity   Alcohol Use Yes    Comment: social     Social History     Substance and Sexual Activity   Drug Use Not on file     Social History     Tobacco Use   Smoking Status Never   Smokeless Tobacco Never     Family History: History reviewed. No pertinent family history. Meds/Allergies   Current Outpatient Medications   Medication Sig Dispense Refill   • [employer] FIBER GUMMIES PO Take by mouth     • FLUoxetine (PROzac) 10 mg capsule Take 10 mg by mouth daily     • metFORMIN (GLUCOPHAGE) 500 mg tablet Take 500 mg by mouth 2 (two) times a day with meals     • methylphenidate (CONCERTA) 18 mg ER tablet Take 18 mg by mouth if needed     • Multiple Vitamin (multivitamin) capsule Take 1 capsule by mouth daily       No current facility-administered medications for this visit. No Known Allergies    Objective   Vitals: Blood pressure 102/68, pulse 91, height 5' 8" (1.727 m), weight 87.5 kg (193 lb). Invasive Devices     None               Body mass index is 29.35 kg/m². /68   Pulse 91   Ht 5' 8" (1.727 m)   Wt 87.5 kg (193 lb)   BMI 29.35 kg/m²    Wt Readings from Last 3 Encounters:   07/25/23 87.5 kg (193 lb)   08/15/22 86.2 kg (190 lb)   05/23/21 76.2 kg (168 lb) (91 %, Z= 1.33)*     * Growth percentiles are based on CDC (Girls, 2-20 Years) data.        GEN: NAD  Eyes: no stare or proptosis, nl lids and conjunctiva, EOMI  Neck: trachea midline, thyroid NT to palpation, nl in size, no nodules or neck masses noted, no cervical LAD  CV: heart reg rate s1s2 nl, no m/r/g appreciated, no ELIDA  Resp: CTAB, good effort  Ab+BS  Neuro: no tremor, 2+ DTRs in BUE  MS: no c/c in digits, moves all 4 ext, nl muscle bulk, gait nl  Skin: warm and dry, no palmar erythema    Physical Exam  Constitutional:       Appearance: Normal appearance. She is obese. Cardiovascular:      Rate and Rhythm: Normal rate and regular rhythm. Pulses: Normal pulses. Pulmonary:      Effort: Pulmonary effort is normal.   Abdominal:      General: Abdomen is flat. Bowel sounds are normal.      Palpations: Abdomen is soft. Skin:     General: Skin is warm and dry. Capillary Refill: Capillary refill takes less than 2 seconds. Neurological:      General: No focal deficit present. Mental Status: She is alert and oriented to person, place, and time.    Psychiatric:         Mood and Affect: Mood normal.         The history was obtained from the review of the chart and from the Patient    Lab Results:   Ref Range & Units 6/27/23  7:57 AM    Cortisol ug/dL 11.1    Comment: Reference Range:   AM Cortisol: 6.7-22.6 ug/dL   PM Cortisol: <10 ug/dL           Ref Range & Units 6/27/23  7:57 AM   Insulin 3.2 - 16.3 uIU/mL 74.6 High       Specimen Collected: 06/27/23  7:57 AM    Performed by: [facility] ([email or web address]) Last Resulted: 06/27/23  7:53 PM   Received From: [facility]  Result Received: 06/28/23  2:01 PM     Ref Range & Units 6/27/23  7:57 AM    T4, Free 0.61 - 1.12 ng/dL 0.68      Specimen Collected: 06/27/23  7:57 AM    Performed by: [facility] ([email or web address]) Last Resulted: 06/27/23  7:45 PM   Received From: [facility]  Result Received: 06/28/23  2:01 PM     Ref Range & Units 6/27/23  7:57 AM    Thyroid Stimulating Hormone 0.45 - 5.33 uIU/mL 2.43      Specimen Collected: 06/27/23  7:57 AM    Performed by: [facility]ORE LAB ([email or web address]) Last Resulted: 06/27/23  7:41 PM   Received From: [facility]  Result Received: 06/28/23  2:01 PM          Component Ref Range & Units 6/29/23  8:18 AM   Hemoglobin A1C <5.7 % 5.3    Comment: Reference Range Non-diabetic                     <5.7   Pre-diabetic                     5.7-6.4   Diabetic                         >=6.5   ADA target for diabetic control  <=7   eAG, EST AVG Glucose mg/dL 105          Ref Range & Units 6/29/23  8:18 AM   Glucose 65 - 99 mg/dL 80      Specimen Collected: 06/29/23  8:18 AM    Performed by: [facility] ([email or web address]) Last Resulted: 06/29/23  9:44 PM   Received From: [address] [place] [address]  Result Received: 07/25/23  1:03 PM       No future appointments.

## 2023-08-22 ENCOUNTER — APPOINTMENT (OUTPATIENT)
Dept: LAB | Facility: CLINIC | Age: 23
End: 2023-08-22
Payer: COMMERCIAL

## 2023-08-22 DIAGNOSIS — E16.1 HYPERINSULINEMIA: ICD-10-CM

## 2023-08-22 DIAGNOSIS — R63.5 WEIGHT GAIN: ICD-10-CM

## 2023-08-22 LAB
GLUCOSE P FAST SERPL-MCNC: 83 MG/DL (ref 65–99)
INSULIN SERPL-ACNC: 4.21 UIU/ML (ref 1.9–23)

## 2023-08-22 PROCEDURE — 36415 COLL VENOUS BLD VENIPUNCTURE: CPT

## 2023-08-22 PROCEDURE — 82157 ASSAY OF ANDROSTENEDIONE: CPT

## 2023-08-22 PROCEDURE — 82947 ASSAY GLUCOSE BLOOD QUANT: CPT

## 2023-08-22 PROCEDURE — 84403 ASSAY OF TOTAL TESTOSTERONE: CPT

## 2023-08-22 PROCEDURE — 82627 DEHYDROEPIANDROSTERONE: CPT

## 2023-08-22 PROCEDURE — 83525 ASSAY OF INSULIN: CPT

## 2023-08-22 PROCEDURE — 83498 ASY HYDROXYPROGESTERONE 17-D: CPT

## 2023-08-22 PROCEDURE — 84402 ASSAY OF FREE TESTOSTERONE: CPT

## 2023-08-23 LAB
DHEA-S SERPL-MCNC: 251 UG/DL (ref 110–431.7)
TESTOST FREE SERPL-MCNC: 1.3 PG/ML (ref 0–4.2)
TESTOST SERPL-MCNC: 26 NG/DL (ref 13–71)

## 2023-08-25 LAB — 17OHP SERPL-MCNC: 157 NG/DL

## 2023-08-26 LAB — ANDROST SERPL-MCNC: 130 NG/DL (ref 41–262)

## 2023-09-26 ENCOUNTER — TELEPHONE (OUTPATIENT)
Dept: PSYCHIATRY | Facility: CLINIC | Age: 23
End: 2023-09-26

## 2023-09-26 NOTE — TELEPHONE ENCOUNTER
Patient has been added to the Medication Management wait list without a referral.    Insurance: blue cross blue shield  Insurance Type:    Commercial [x]   Medicaid []   [place] (if applicable)   Medicare []  Location Preference: no loc pref  Provider Preference: no prov pref  Virtual: Yes [] No [x]  Were outside resources sent: Yes [] No [x]

## 2023-10-03 ENCOUNTER — TELEPHONE (OUTPATIENT)
Dept: PSYCHIATRY | Facility: CLINIC | Age: 23
End: 2023-10-03

## 2023-10-03 NOTE — TELEPHONE ENCOUNTER
Contacted patient off of NON-REFERRAL to verify needs of services in attempts to offer patient an appointment at [facility] .  spoke with patient whom stated  They are still interested in services

## 2023-10-03 NOTE — TELEPHONE ENCOUNTER
Behavioral Health Outpatient Intake Questions    Referred By   : pcp     Please advise interviewee that they need to answer all questions truthfully to allow for best care, and any misrepresentations of information may affect their ability to be seen at this clinic   => Was this discussed? Yes     If Minor Child (under age 25)    Who is/are the legal guardian(s) of the child? Is there a custody agreement? • If "YES"- Custody orders must be obtained prior to scheduling the first appointment  • In addition, Consent to Treatment must be signed by all legal guardians prior to scheduling the first appointment    • If "NO"- Consent to Treatment must be signed by all legal guardians prior to scheduling the first appointment    Behavioral Health Outpatient Intake History -     Presenting Problem (in patient's own words): ADD; Anxiety    Are there any communication barriers for this patient? Yes                                               If yes, please describe barriers: ADD  • If there is a unique situation, please refer to [address] for final determination. Are you taking any psychiatric medications? Yes   •   If "YES" -What are they Prozac   •   If "YES" -Who prescribes? Previous psychiatrist   Tayla Quiles    Has the Patient previously received outpatient Talk Therapy or Medication Management from [facility]  No     •    If "YES"- When, Where and with Whom? •    If "NO" -Has Patient received these services elsewhere? •   If "YES" -When, Where, and with Whom? Tayla Quiles ([id number])   Med mgmt; virtually    Has the Patient abused alcohol or other substances in the last 6 months ? No  No concerns of substance abuse are reported. •  If "YES" -What substance, How much, How often? •  If illegal substance: Refer to [facility] (for ELLYN) or [facility].   •  If Alcohol in excess of 10 drinks per week:  Refer to [facility] (for ELL[facility]) or [facility] History-     Is this treatment court ordered? No   If "yes "send to :  • Talk Therapy : Send to [address] [place] [address] for final determination   • Med Management: Send to Dr Tamie Snider for final determination     Has the Patient been convicted of a felony? No   If "Yes" send to -When, What? • Talk Therapy : Send to [address] [place] [address] for final determination   • Med Management: Send to Dr Tamie Snider for final determination     ACCEPTED as a patient Yes  • If "Yes" Appointment Date: Stormy Chandler 11/8 @      Referred Elsewhere? No  • If “Yes” - (Where? Ex: [facility], [facility], [address] [place] [address], etc.)       Name of Insurance Co: blue cross [employer] ID# [id number]  Insurance Phone # [phone]  If ins is primary or secondary? If patient is a minor, parents information such as Name, D. O.B of guarantor.

## 2023-11-07 ENCOUNTER — TELEPHONE (OUTPATIENT)
Dept: PSYCHIATRY | Facility: CLINIC | Age: 23
End: 2023-11-07

## 2023-11-07 NOTE — TELEPHONE ENCOUNTER
Contacted patient to inform them that their appointment for 12/4/23 at 5:00p was cancelled due to the provider being out of office. Patient stated that she will reschedule the appointment tomorrow when she is in office.

## 2023-11-08 ENCOUNTER — OFFICE VISIT (OUTPATIENT)
Dept: PSYCHIATRY | Facility: CLINIC | Age: 23
End: 2023-11-08
Payer: COMMERCIAL

## 2023-11-08 DIAGNOSIS — F33.42 MDD (MAJOR DEPRESSIVE DISORDER), RECURRENT, IN FULL REMISSION (HCC): ICD-10-CM

## 2023-11-08 DIAGNOSIS — F90.0 ATTENTION DEFICIT HYPERACTIVITY DISORDER, INATTENTIVE TYPE: Primary | ICD-10-CM

## 2023-11-08 PROCEDURE — 90792 PSYCH DIAG EVAL W/MED SRVCS: CPT | Performed by: NURSE PRACTITIONER

## 2023-11-08 RX ORDER — MELATONIN
1000 DAILY
COMMUNITY

## 2023-11-08 RX ORDER — CLINDAMYCIN PHOSPHATE 10 UG/ML
1 LOTION TOPICAL DAILY
COMMUNITY
Start: 2023-10-19

## 2023-11-08 RX ORDER — FLUOXETINE 10 MG/1
10 CAPSULE ORAL DAILY
Qty: 21 CAPSULE | Refills: 0 | Status: SHIPPED | OUTPATIENT
Start: 2023-11-08 | End: 2023-11-08 | Stop reason: CLARIF

## 2023-11-08 RX ORDER — FLUOXETINE 10 MG/1
10 CAPSULE ORAL DAILY
Qty: 21 CAPSULE | Refills: 0 | Status: SHIPPED | OUTPATIENT
Start: 2023-11-08

## 2023-11-08 NOTE — PSYCH
Outpatient Psychiatry Intake Exam       PCP: Zi Ng    Referral source: Self Referred    Identifying information:  Chip Wilson is a 26 y.o. female with a history of anxiety, adhd here for evaluation and determination of mental health management needs. Sources of information:   Information for this evaluation was gathered through direct interview with the patient. Additionally EMR was reviewed. Confidentiality discussion: Limits of confidentiality in cases of safety concerns involving self and others as well as this physician's role as a mandatory  of abuse. They voiced understanding and a desire to continue with the evaluation. Visit Time    Visit Start Time: 1500  Visit Stop Time: 1600  Total Visit Duration:  60 minutes     SUBJECTIVE     Chief Complaint / reason for visit: " I am here for medication management for my ADHD. "    History of Present Illness:    Barbie Lyn is a 26year old single female who currently lives with her parents. 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 She reports that at age 14 she was having trouble in school and had the inability to focus, and had trouble sitting in one place focus. She was messy, disorganized, could not finish her homework, and her grades were suffering. She states that she saw a pediatrician at age 14 and was evaluated for ADHD. She was given Concerta and was much improved with that medication. When she stopped school, she stopped using the medication and has been fine since then. She did not have an IEP. She did go back to see a PA-C in August 2023 following the break up with her boyfriend and when she was starting to have trouble focusing in [address] but was only given Prozac at the time, which she states did help with her handling of the break up but has not helped her with her focus and attention.   She is still messy and disorganized in school and has trouble focusing. She was hopeful that she would be able to be prescribed Concerta for her inattention at this time. She currently denies any depression and is happy and outgoing at school and with friends. Reports that she has normal anxiety that is manageable. She denies any previous symptoms of sully or psychosis. Denies any suicide attempts. Denies any current SI/HI. Denies past hospitalizations. Reports that her dad has ADHD and mom has anxiety. Has 2 younger sisters, one with anxiety. She is calm, pleasant, and appropriate in conversation. She agreed to sign an BELGICA for her previous doctor who evaluated her for the ADHD and prescribed the Concerta. She also agreed to have an EKG done before being prescribed the stimulant. She will follow up in one month. Onset of symptoms was age 14 with unchanged course since that time. Stressors: school    HPI ROS:  Medication Side Effects: denies  Depression: denies /10 (10 worst)  Anxiety: normal anxiety, social and manageable /10 (10 worst)  Safety (SI, HI, other): denies  Sleep: 6-8 hours/night  Energy: good  Appetite: good    Current Rating Scores:     Current PHQ-9   PHQ-2/9 Depression Screening    Little interest or pleasure in doing things: 0 - not at all  Feeling down, depressed, or hopeless: 0 - not at all  Trouble falling or staying asleep, or sleeping too much: 1 - several days  Feeling tired or having little energy: 1 - several days  Poor appetite or overeatin - not at all  Feeling bad about yourself - or that you are a failure or have let yourself or your family down: 0 - not at all  Trouble concentrating on things, such as reading the newspaper or watching television: 3 - nearly every day  Moving or speaking so slowly that other people could have noticed.  Or the opposite - being so fidgety or restless that you have been moving around a lot more than usual: 0 - not at all  Thoughts that you would be better off dead, or of hurting yourself in some way: 0 - not at all  PHQ-9 Score: 5   PHQ-9 Interpretation: Mild depression        Current CARY-7 is   CARY-7 Flowsheet Screening    Flowsheet Row Most Recent Value   Over the last 2 weeks, how often have you been bothered by any of the following problems? Feeling nervous, anxious, or on edge 1   Not being able to stop or control worrying 0   Worrying too much about different things 1   Trouble relaxing 0   Being so restless that it is hard to sit still 0   Becoming easily annoyed or irritable 0   Feeling afraid as if something awful might happen 0   CARY-7 Total Score 2      . In terms of depression, the patient endorses change in sleep, trouble concentrating. In terms of bipolar disorder, the patient endorses no. Symptoms include no symptoms    CARY symptoms: no symptoms suggestive of CARY  Panic Disorder symptoms: no symptoms suggestive of panic disorder  Social Anxiety symptoms: no symptoms suggestive of social anxiety  OCD Symptoms: No significant symptoms supportive of OCD  Eating Disorder symptoms: no historical or current eating disorder. no binge eating disorder; no anorexia nervosa. no symptoms of bulimia    In terms of PTSD, the patient endorses exposure to trauma involving: denies; intrusive symptoms including (1+): no intrusive symptoms; avoidance symptoms including (1+): no avoidance symptoms; Negative alterations including (2+): no negative alteration symptoms; hyperarousal symptoms including (2+): no arousal symptoms. In terms of psychotic symptoms, the patient reports no psychotic symptoms now or in the past.    Past Psychiatric History  Previous diagnoses include ADHD and anxiety    Prior outpatient psychiatric treatment: used to see a pediatric doctor at [facility] who is no longer there.     Prior therapy: yes, but was not effective at the time    Prior inpatient psychiatric treatment: denies    Prior suicide attempts: denies    Prior self harm: denies    Prior violence or aggression: denies    Social History:    The patient grew up in [place]. Childhood was described as "good". During childhood, parents were . They have 2 sister(s) and 0 brother(s). Patient is oldest in birth order    Abuse/neglect: none    As far as the patient (or present family member) is aware, overall childhood development: Patient does ascribe to normal developmental milestones such as walking, talking, potty training and making childhood friends. Education level: some college, currently in nursing school   Current occupation: PCA at [employer]  Marital status: single  Children: n/a  Current Living Situation: the patient currently lives at home with mom and dad . Social support: good, mom and dad, sisters    Jainism Affiliation: n/a   experience: n/a  Legal history: n/a  Access to Guns: dad has guns, locked    Substance use and treatment:  Tobacco use: denies  Caffeine Use: yes, coffee  ETOH use: 2 drinks socially every other week  Other substance use: cannabis in past.    Endorses previous experimentation with: cannabis    Longest clean time: not applicable  History of Inpatient/Outpatient rehabilitation program: no    Traumatic History:     Abuse: none  Other Traumatic Events: none     Family Psychiatric History:     Psychiatric Illness: Mother - anxiety disorder, Father - ADHD, Sister - anxiety disorder  Substance Abuse:  Grandfather - alcohol abuse, Aunt - alcohol abuse, Uncle - alcohol abuse  Suicide Attempts:  patient denies    Family History   Problem Relation Age of Onset   • Anxiety disorder Mother    • ADD / ADHD Father    • Anxiety disorder Sister         Past Medical / Surgical History:    Current PCP: Alicia Salazar   Other providers include: There is no problem list on file for this patient.       Past Medical History-  Past Medical History:   Diagnosis Date   • Acid reflux    • ADHD (attention deficit hyperactivity disorder)    • Anxiety    • GERD (gastroesophageal reflux disease)         History of Seizures: no  History of Head injury-LOC-Concussion: no    Past Surgical History-  Past Surgical History:   Procedure Laterality Date   • AUGMENTATION BREAST Right 8/15/2022    Procedure: AUGMENTATION BREAST;  Surgeon: Mike Arroyo MD;  Location: [address] [place] [address];  Service: Plastics   • UT MASTOPEXY Bilateral 8/15/2022    Procedure: MASTOPEXY;  Surgeon: Mike Arroyo MD;  Location: [address] [place] [address];  Service: Plastics   • WISDOM TOOTH EXTRACTION          Allergies: reviewed  No Known Allergies    Recent labs:  No visits with results within 1 Month(s) from this visit. Latest known visit with results is:   Appointment on 08/22/2023   Component Date Value   • Testosterone, Free 08/22/2023 1.3    • TESTOSTERONE TOTAL 08/22/2023 26    • Androstenedione 08/22/2023 130    • 17-OH PROGESTERONE 08/22/2023 157    • DHEA-SO4 08/22/2023 251.0    • Insulin, Fasting 08/22/2023 4.21    • Glucose, Fasting 08/22/2023 83      Labs were reviewed    Medical Review Of Systems:    Patient admits to ADHD; otherwise A comprehensive review of systems was negative. Medications:  Current Outpatient Medications on File Prior to Visit   Medication Sig Dispense Refill   • cholecalciferol (VITAMIN D3) 1,000 units tablet Take 1,000 Units by mouth daily     • clindamycin (CLEOCIN T) 1 % lotion Apply 1 Application topically in the morning     • Multiple Vitamin (multivitamin) capsule Take 1 capsule by mouth daily     • [employer] FIBER GUMMIES PO Take by mouth (Patient not taking: Reported on 11/8/2023)     • methylphenidate (CONCERTA) 18 mg ER tablet Take 18 mg by mouth if needed (Patient not taking: Reported on 11/8/2023)       No current facility-administered medications on file prior to visit. Medication Compliant? yes    All current active medications have been reviewed. Objective     OBJECTIVE     There were no vitals taken for this visit.     MENTAL STATUS EXAM  Appearance:  age appropriate, dressed casually, in school uniform    Behavior:  pleasant, cooperative, with good eye contact   Speech:  Normal volume, regular rate and rhythm   Mood:  euthymic   Affect:  mood congruent   Language: intact and appropriate for age, education, and intellect   Thought Process:  Linear and goal directed   Associations: intact associations   Thought Content:  normal and appropriate   Perceptual Disturbances: no auditory or visual hallcunations   Risk Potential / Abnormal Thoughts: Suicidal ideation - None  Homicidal ideation - None  Potential for aggression - No       Consciousness:  Alert & Awake   Sensorium:  Grossly oriented   Attention: attention span and concentration are age appropriate   Intellect: within normal limits   Fund of Knowledge:  Memory: awareness of current events: normal  past history: normal  vocabulary: normal  recent and remote memory grossly intact   Insight:  good   Judgment: good   Muscle Strength Muscle Tone: normal  normal   Gait/Station: normal gait/station with good balance   Motor Activity: no abnormal movements     Pain none   Pain Scale 0     IMPRESSIONS/FORMULATION          Diagnoses and all orders for this visit:    Attention deficit hyperactivity disorder, inattentive type  -     ECG 12 lead; Future    MDD (major depressive disorder), recurrent, in full remission ([address])  -     Discontinue: FLUoxetine (PROzac) 10 mg capsule; Take 1 capsule (10 mg total) by mouth daily  -     FLUoxetine (PROzac) 10 mg capsule; Take 1 capsule (10 mg total) by mouth daily For 14 days, then every other day until gone    Other orders  -     clindamycin (CLEOCIN T) 1 % lotion; Apply 1 Application topically in the morning  -     cholecalciferol (VITAMIN D3) 1,000 units tablet; Take 1,000 Units by mouth daily        1. Attention deficit hyperactivity disorder, inattentive type    2.  MDD (major depressive disorder), recurrent, in full remission ([address])          Rohit Martins is a 26 y.o. female who presents with symptoms supporting the aforementioned. Suicide / Homicide / Safety risk assessment: At this time Ambrosio Beltran is at no risk for harm of self or others. Plan:       Education about diagnosis and treatment modalities, patient voiced understanding and agreement with the following plan:    Discussed medication risks, beneftis, alternatives. Patient was informed and had time to ask questions. They agreed to treatment below, Risks, benefits, and possible side effects of medications explained to patient and patient verbalizes understanding, Risks of medications explained if female patient. Patient verbalizes understanding and agrees to notify her doctor if she becomes pregnant. , and Patient understands risks related to pregnancy and breastfeeding. PARQ regarding medications and treatment discussed and patient agreed to treatment below. Controlled Medication Discussion:     Not applicable    Initial treatment plan:   1) MEDS:    - Currently taking 20mg of Prozac, but would like to taper off d/t not feeling as though she needs it at this time. She will take 10mg PO daily x7 days, then every other day x7 days   - Will wait for records of evaluation of ADHD from prior psychiatrist and evaluate necessity of concerta. Will otherwise need psychological (ADHD) testing. 2) Labs: EKG    3) Therapy:    - n/a    4) Medical:    - Pt will f/u with other providers as needed    5) Other: Support as needed   - Patient will call prior to scheduled appointment if they have any issues or concerns. Patient understands they can access the office by calling the main number at any time if they are in crisis. They also understand they can call their [facility]'s crisis number or go to their nearest ED if suicidal ideation increases or if they develop a plan or intent.      6) Follow up:   - Follow up in one month    - Patient will call if issues or concerns     7) Treatment Plan:    - Enacted will enact at next appointment due to time constraints of this appointment     Discussed self monitoring of symptoms, and symptom monitoring tools. Patient has been informed of 24 hours and weekend coverage for urgent situations accessed by calling the main clinic phone number.      This note was not shared with the patient due to reasonable likelihood of causing patient harm

## 2023-11-27 ENCOUNTER — TELEPHONE (OUTPATIENT)
Dept: PSYCHIATRY | Facility: CLINIC | Age: 23
End: 2023-11-27

## 2023-11-27 ENCOUNTER — OFFICE VISIT (OUTPATIENT)
Dept: LAB | Age: 23
End: 2023-11-27
Payer: COMMERCIAL

## 2023-11-27 DIAGNOSIS — F90.0 ATTENTION DEFICIT HYPERACTIVITY DISORDER, INATTENTIVE TYPE: ICD-10-CM

## 2023-11-27 LAB
ATRIAL RATE: 61 BPM
P AXIS: 53 DEGREES
PR INTERVAL: 138 MS
QRS AXIS: 63 DEGREES
QRSD INTERVAL: 96 MS
QT INTERVAL: 422 MS
QTC INTERVAL: 424 MS
T WAVE AXIS: 21 DEGREES
VENTRICULAR RATE: 61 BPM

## 2023-11-27 PROCEDURE — 93005 ELECTROCARDIOGRAM TRACING: CPT

## 2023-11-27 NOTE — TELEPHONE ENCOUNTER
I'm still waiting on records from her previous provider. BELGICA was faxed to them on 11/9/23 but we have not heard back yet. If she could expedite that, it would be helpful.

## 2023-11-27 NOTE — TELEPHONE ENCOUNTER
Patient contacted the office stating that she received her EKG results and wanted to confirm with her provider that they were received correctly so that she can get the script for Concerta. Please review. Thank you!

## 2023-11-29 DIAGNOSIS — F33.42 MDD (MAJOR DEPRESSIVE DISORDER), RECURRENT, IN FULL REMISSION (HCC): ICD-10-CM

## 2023-12-04 RX ORDER — FLUOXETINE 10 MG/1
10 CAPSULE ORAL DAILY
Qty: 21 CAPSULE | Refills: 0 | Status: SHIPPED | OUTPATIENT
Start: 2023-12-04

## 2023-12-08 ENCOUNTER — OFFICE VISIT (OUTPATIENT)
Dept: PSYCHIATRY | Facility: CLINIC | Age: 23
End: 2023-12-08

## 2023-12-08 DIAGNOSIS — Z91.199 NO-SHOW FOR APPOINTMENT: Primary | ICD-10-CM

## 2023-12-08 DIAGNOSIS — F41.1 GENERALIZED ANXIETY DISORDER: ICD-10-CM

## 2023-12-08 NOTE — PSYCH
No Call. No Show.  No Charge    Malathi Search no showed 12/08/23 appointment , staff called and left message to reschedule appointment     Treatment Plan not completed within required time limits due to: Malathi Search no show appointment on 12/08/23

## 2023-12-11 ENCOUNTER — OFFICE VISIT (OUTPATIENT)
Dept: PSYCHIATRY | Facility: CLINIC | Age: 23
End: 2023-12-11
Payer: COMMERCIAL

## 2023-12-11 DIAGNOSIS — Z13.39 ADHD (ATTENTION DEFICIT HYPERACTIVITY DISORDER) EVALUATION: Primary | ICD-10-CM

## 2023-12-11 PROCEDURE — 99213 OFFICE O/P EST LOW 20 MIN: CPT | Performed by: NURSE PRACTITIONER

## 2023-12-12 PROBLEM — Z13.39 ADHD (ATTENTION DEFICIT HYPERACTIVITY DISORDER) EVALUATION: Status: ACTIVE | Noted: 2023-12-12

## 2023-12-12 NOTE — PSYCH
Regular Visit    Problem List Items Addressed This Visit     ADHD (attention deficit hyperactivity disorder) evaluation - Primary            Encounter provider MARVA Anguiano    Provider located at    [address] [address] [address] [phone]    Recent Visits  Date Type Provider Dept   12/11/23 Office Visit Pietro Isabel, [address]  recent visits within past 7 days and meeting all other requirements  Future Appointments  No visits were found meeting these conditions. Showing future appointments within next 150 days and meeting all other requirements       HPI     Current Outpatient Medications   Medication Sig Dispense Refill   • cholecalciferol (VITAMIN D3) 1,000 units tablet Take 1,000 Units by mouth daily     • clindamycin (CLEOCIN T) 1 % lotion Apply 1 Application topically in the morning     • [employer] FIBER GUMMIES PO Take by mouth (Patient not taking: Reported on 11/8/2023)     • FLUoxetine (PROzac) 10 mg capsule TAKE 1 CAPSULE (10 MG TOTAL) BY MOUTH DAILY FOR 14 DAYS, THEN EVERY OTHER DAY UNTIL GONE 21 capsule 0   • methylphenidate (CONCERTA) 18 mg ER tablet Take 18 mg by mouth if needed (Patient not taking: Reported on 11/8/2023)     • Multiple Vitamin (multivitamin) capsule Take 1 capsule by mouth daily       No current facility-administered medications for this visit. Review of Systems    I spent 15 minutes directly with the patient during this visit      [id number]    Name and Date of Birth:  Joyce Mcgregor 26 y.o. 2000 MRN: [id number]    Date of Visit: December 12, 2023    No Known Allergies    Visit Time    Visit Start Time: 2973  Visit Stop Time: 2442  Total Visit Duration:  15 minutes    SUBJECTIVE:    Beba Echevarria is seen today for a follow up for ADHD.  She continues to experience on and off symptoms since the last visit. Barbie Lyn seen in the office today for medication management follow up. She was last seen by this provider on 11/8/23. She reports today that she is off the prozac and her mood is good. She denies depression, and has normal anxiety 2/10 which is manageable. States she continues to have some social anxiety that is also manageable. She states that school is good, however she continues to have issues with focus and concentration and has trouble sitting still and focusing throughout her classes. States that when she is trying to study at home, she needs to take breaks after every study question or she will not be able to concentrate and the studying takes much longer than it would have in the past when she did use the concerta. Let her know that we did fax the BELGICA for her records at [facility] a couple of times but have not yet received anything from them as far as her past records and ADHD evaluation. She states that she has also called them. This provider will try to call and make sure we are sending the BELGICA to the appropriate fax. She denies SI/HI. States she is sleeping well. Will follow up after we have received the records from [facility]. She is agreeable at this time. She will call sooner if concerns or issues arise prior to scheduled appointment. She  is not taking any psychiatric medications at this time. PLAN:  Will await [facility] records prior to prescribing any medications  She has d/c the prozac. Will follow up after records have been received. She will call sooner with concerns or issues if they arise prior to scheduled appointment  Aware of 24 hour and weekend coverage for urgent situations accessed by calling [facility] main practice number  Medication management every tbd .   Aware of need to follow up with family physician for medical issues    Diagnoses and all orders for this visit:    ADHD (attention deficit hyperactivity disorder) evaluation        Current Outpatient Medications on File Prior to Visit   Medication Sig Dispense Refill   • cholecalciferol (VITAMIN D3) 1,000 units tablet Take 1,000 Units by mouth daily     • clindamycin (CLEOCIN T) 1 % lotion Apply 1 Application topically in the morning     • [employer] FIBER GUMMIES PO Take by mouth (Patient not taking: Reported on 11/8/2023)     • FLUoxetine (PROzac) 10 mg capsule TAKE 1 CAPSULE (10 MG TOTAL) BY MOUTH DAILY FOR 14 DAYS, THEN EVERY OTHER DAY UNTIL GONE 21 capsule 0   • methylphenidate (CONCERTA) 18 mg ER tablet Take 18 mg by mouth if needed (Patient not taking: Reported on 11/8/2023)     • Multiple Vitamin (multivitamin) capsule Take 1 capsule by mouth daily       No current facility-administered medications on file prior to visit. Psychotherapy Provided:     Individual psychotherapy provided: Yes  Counseling was provided during the session today for 16 minutes. Supportive counseling provided. Discussed with Kaylie coping with occasional anxiety. Coping strategies reviewed with Kaylie. Importance of medication and treatment compliance reviewed with Kaylie. Importance of follow up with family physician for medical issues reviewed with Kaylie. Reassurance and supportive therapy provided. Crisis/safety plan discussed with Kaylie. Patient will call prior to scheduled appointment if they have any issues or concerns. Patient understands they can access the office by calling the main number at any time if they are in crisis. They also understand they can call their county's crisis number or go to their nearest ED if suicidal ideation increases or if they develop a plan or intent. HPI ROS Appetite Changes and Sleep:     She reports normal sleep, normal appetite, normal energy level.  Denies homicidal ideation, denies suicidal ideation    Review Of Systems:      HPI ROS:               Medication Side Effects:  denies   Depression (10 worst): 0/10   Anxiety (10 worst): 2/10   Safety concerns (SI, HI, etc): denies   Sleep: good   Energy: good   Appetite: good     General normal    Personality no change in personality   Constitutional as noted in HPI   ENT negative   Cardiovascular negative   Respiratory negative   Gastrointestinal negative   Genitourinary negative   Musculoskeletal negative   Integumentary negative   Neurological negative   Endocrine negative   Other Symptoms none, all other systems are negative     Mental Status Evaluation:    Appearance Appropriately dressed and Good eye contact   Behavior calm and cooperative   Mood euthymic  Depression Scale - 0 of 10 (0 = No depression)  Anxiety Scale - 2 of 10 (0 = No anxiety)   Speech Normal rate and volume   Affect appropriate and mood-congruent   Thought Processes Goal directed and coherent   Thought Content Does not verbalize delusional material   Associations Tightly connected   Perceptual Disturbances Denies hallucinations and does not appear to be responding to internal stimuli   Risk Potential Suicidal/Homicidal Ideation - No evidence of suicidal or homicidal ideation and patient does not verbalize suicidal or homicidal ideation  Risk of Violence - No evidence of risk for violence found on assessment  Risk of Self Mutilation - No evidence of risk for self mutilation found on assessment   Orientation oriented to person, place, time/date, and situation   Memory recent and remote memory grossly intact   Consciousness alert and awake   Attention/Concentration attention span and concentration are age appropriate   Insight intact   Judgement intact   Muscle Strength and Gait normal muscle strength and normal muscle tone, normal gait/station and normal balance   Motor Activity no abnormal movements   Language no difficulty naming common objects, no difficulty repeating a phrase, no difficulty writing a sentence   Fund of Knowledge adequate knowledge of current events  adequate fund of knowledge regarding past history  adequate fund of knowledge regarding vocabulary      Past Psychiatric History Update:     Inpatient Psychiatric Admission Since Last Encounter:   no  Changes to Outpatient Psychiatric Treatment Team:    no  Suicide Attempt Or Self Mutilation Since Last Encounter:   no  Incidence of Violent Behavior Since Last Encounter:   no    Traumatic History Update:     New Onset of Abuse Since Last Encounter:   no  Traumatic Events Since Last Encounter:   no    Past Medical History:    Past Medical History:   Diagnosis Date   • Acid reflux    • ADHD (attention deficit hyperactivity disorder)    • Anxiety    • GERD (gastroesophageal reflux disease)         Past Surgical History:   Procedure Laterality Date   • AUGMENTATION BREAST Right 8/15/2022    Procedure: AUGMENTATION BREAST;  Surgeon: Kaylene Palm MD;  Location: [facility] MAIN OR;  Service: Plastics   • PA MASTOPEXY Bilateral 8/15/2022    Procedure: MASTOPEXY;  Surgeon: Kaylene Palm MD;  Location: [facility] MAIN OR;  Service: Plastics   • WISDOM TOOTH EXTRACTION       No Known Allergies  Substance Abuse History:    Social History     Substance and Sexual Activity   Alcohol Use Yes   • Alcohol/week: 2.0 standard drinks of alcohol   • Types: 2 Standard drinks or equivalent per week    Comment: social 2 drinks every other week     Social History     Substance and Sexual Activity   Drug Use Not Currently     Social History:    Social History     Socioeconomic History   • Marital status: Single     Spouse name: Not on file   • Number of children: Not on file   • Years of education: some college   • Highest education level: Some college, no degree   Occupational History     Employer: [employer]   Tobacco Use   • Smoking status: Never   • Smokeless tobacco: Never   Vaping Use   • Vaping Use: Former   Substance and Sexual Activity   • Alcohol use:  Yes     Alcohol/week: 2.0 standard drinks of alcohol     Types: 2 Standard drinks or equivalent per week     Comment: social 2 drinks every other week   • Drug use: Not Currently   • Sexual activity: Not Currently   Other Topics Concern   • Not on file   Social History Narrative   • Not on file     Social Determinants of Health     Financial Resource Strain: Not on file   Food Insecurity: Not on file   Transportation Needs: Not on file   Physical Activity: Not on file   Stress: Not on file   Social Connections: Not on file   Intimate Partner Violence: Not on file   Housing Stability: Not on file     Family Psychiatric History:     Family History   Problem Relation Age of Onset   • Anxiety disorder Mother    • ADD / ADHD Father    • Anxiety disorder Sister      History Review: The following portions of the patient's history were reviewed and updated as appropriate: allergies, current medications, past family history, past medical history, past social history, past surgical history, and problem list     OBJECTIVE:     Vital signs in last 24 hours: There were no vitals filed for this visit. Laboratory Results: Recent Labs (last 2 months):   Office Visit on 11/27/2023   Component Date Value   • Ventricular Rate 11/27/2023 61    • Atrial Rate 11/27/2023 61    • IL Interval 11/27/2023 138    • QRSD Interval 11/27/2023 96    • QT Interval 11/27/2023 422    • QTC Interval 11/27/2023 424    • P Axis 11/27/2023 53    • QRS Axis 11/27/2023 63    • T Wave Axis 11/27/2023 21      I have personally reviewed all pertinent laboratory/tests results.     Suicide/Homicide Risk Assessment:    Risk of Harm to Self:  The following ratings are based on assessment at the time of the interview  Recent Specific Risk Factors include: current anxiety symptoms  Demographic risk factors include: , age: young adult (15-24)  Historical Risk Factors include: history of depression, history of anxiety  Protective Factors: no current suicidal ideation, access to mental health treatment, compliant with medications, compliant with mental health treatment, effective coping skills, effective decision-making skills, having a desire to live, having a sense of purpose or meaning in life, resiliency, stable living environment, stable job, sense of determination, strong relationships, supportive family, supportive friends  Weapons: none. The following steps have been taken to ensure weapons are properly secured: not applicable  Based on today's assessment, Milton Valverde presents the following risk of harm to self: none    Risk of Harm to Others: The following ratings are based on assessment at the time of the interview  Protective Factors: no current homicidal ideation  Based on today's assessment, Milton Valverde presents the following risk of harm to others: none    The following interventions are recommended: contracts for safety at present - agrees to go to ED if feeling unsafe, contracts for safety at present - agrees to call Crisis Intervention Service if feeling unsafe    Medications Risks/Benefits:      Risks, Benefits And Possible Side Effects Of Medications:    Discussed risks and benefits of treatment with patient including :N/A     Controlled Medication Discussion:     Not applicable    Treatment Plan:    Due for update/Updated:   yes  Last treatment plan done --- will be completed at next appointment. MARVA Scott 12/12/23    This note was shared with patient.

## 2023-12-27 LAB
EXTERNAL CHLAMYDIA RESULT: NOT DETECTED
N GONORRHOEA RRNA SPEC QL PROBE: NOT DETECTED

## 2024-01-05 ENCOUNTER — OFFICE VISIT (OUTPATIENT)
Dept: URGENT CARE | Facility: CLINIC | Age: 24
End: 2024-01-05
Payer: COMMERCIAL

## 2024-01-05 VITALS
BODY MASS INDEX: 29.25 KG/M2 | RESPIRATION RATE: 18 BRPM | DIASTOLIC BLOOD PRESSURE: 84 MMHG | HEART RATE: 84 BPM | HEIGHT: 68 IN | TEMPERATURE: 97.3 F | OXYGEN SATURATION: 96 % | WEIGHT: 193 LBS | SYSTOLIC BLOOD PRESSURE: 124 MMHG

## 2024-01-05 DIAGNOSIS — R50.9 FEVER, UNSPECIFIED FEVER CAUSE: ICD-10-CM

## 2024-01-05 DIAGNOSIS — R52 GENERALIZED BODY ACHES: Primary | ICD-10-CM

## 2024-01-05 LAB
SARS-COV-2 AG UPPER RESP QL IA: NEGATIVE
VALID CONTROL: NORMAL

## 2024-01-05 PROCEDURE — 87636 SARSCOV2 & INF A&B AMP PRB: CPT | Performed by: FAMILY MEDICINE

## 2024-01-05 PROCEDURE — 99214 OFFICE O/P EST MOD 30 MIN: CPT | Performed by: FAMILY MEDICINE

## 2024-01-05 RX ORDER — DROSPIRENONE AND ETHINYL ESTRADIOL 0.02-3(28)
1 KIT ORAL DAILY
COMMUNITY
Start: 2023-12-27 | End: 2024-12-26

## 2024-01-05 RX ORDER — OSELTAMIVIR PHOSPHATE 75 MG/1
75 CAPSULE ORAL EVERY 12 HOURS SCHEDULED
Qty: 10 CAPSULE | Refills: 0 | Status: SHIPPED | OUTPATIENT
Start: 2024-01-05 | End: 2024-01-10

## 2024-01-05 NOTE — PROGRESS NOTES
[facility]        NAME: Sanam Garner is a 23 y.o. female  : 2000    MRN: [id number]  DATE: 2024  TIME: 12:32 PM    Assessment and Plan   Generalized body aches [R52]  1. Generalized body aches  Poct Covid 19 Rapid Antigen Test    oseltamivir (TAMIFLU) 75 mg capsule      2. Fever, unspecified fever cause  oseltamivir (TAMIFLU) 75 mg capsule    Cov/Flu - Lab Collect            Patient Instructions   Body aches and fever-suspect influenza.  Rapid COVID test was negative.  Will treat with antiviral Tamiflu 75 mg - 1 tablet by mouth twice daily for 5 days.  Will send out a flu swab to confirm.  If test comes back negative, may discontinue Tamiflu.  [facility]  Continue masking for full 10 days since onset of illness.  Wash hands frequently.  Tylenol on as-needed basis for body aches.  Follow-up if symptoms persist or worsen despite above treatment.    Follow up with PCP in 3-5 days.  Proceed to  ER if symptoms worsen.    Chief Complaint     Chief Complaint   Patient presents with    Generalized Body Aches     Patient c/o body aches and chills that started two days ago.         History of Present Illness       Patient presents for evaluation of body aches which started 2 days ago.  These are generalized.  Mild headache.  No overt sore throat or cough.  No GI symptoms.  Patient did have fevers around 100.4.  No rashes.  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  Patient also started birth control Paulina 5 days ago.  She thought perhaps the medication is causing body aches but then started with a fever.  She was treated with Paulina for 3 years in the past without any side effects.  Patient denies any shortness of breath, leg swelling, leg redness or calf tenderness.        Review of Systems   Review of Systems   Constitutional:  Positive for chills, fatigue and fever.   HENT:  Negative  for congestion, postnasal drip and sore throat.    Respiratory:  Negative for cough.    Gastrointestinal:  Negative for diarrhea, nausea and vomiting.   Musculoskeletal:  Positive for myalgias.   Neurological:  Positive for headaches.         Current Medications       Current Outpatient Medications:     drospirenone-ethinyl estradiol (SANJUANITA) 3-0.02 MG per tablet, Take 1 tablet by mouth daily, Disp: , Rfl:     oseltamivir (TAMIFLU) 75 mg capsule, Take 1 capsule (75 mg total) by mouth every 12 (twelve) hours for 5 days, Disp: 10 capsule, Rfl: 0    cholecalciferol (VITAMIN D3) 1,000 units tablet, Take 1,000 Units by mouth daily (Patient not taking: Reported on 1/5/2024), Disp: , Rfl:     clindamycin (CLEOCIN T) 1 % lotion, Apply 1 Application topically in the morning (Patient not taking: Reported on 1/5/2024), Disp: , Rfl:     [employer] FIBER GUMMIES PO, Take by mouth (Patient not taking: Reported on 11/8/2023), Disp: , Rfl:     FLUoxetine (PROzac) 10 mg capsule, TAKE 1 CAPSULE (10 MG TOTAL) BY MOUTH DAILY FOR 14 DAYS, THEN EVERY OTHER DAY UNTIL GONE (Patient not taking: Reported on 1/5/2024), Disp: 21 capsule, Rfl: 0    methylphenidate (CONCERTA) 18 mg ER tablet, Take 18 mg by mouth if needed (Patient not taking: Reported on 11/8/2023), Disp: , Rfl:     Multiple Vitamin (multivitamin) capsule, Take 1 capsule by mouth daily (Patient not taking: Reported on 1/5/2024), Disp: , Rfl:     Current Allergies     Allergies as of 01/05/2024    (No Known Allergies)            The following portions of the patient's history were reviewed and updated as appropriate: allergies, current medications, past family history, past medical history, past social history, past surgical history and problem list.     Past Medical History:   Diagnosis Date    Acid reflux     ADHD (attention deficit hyperactivity disorder)     Anxiety     GERD (gastroesophageal reflux disease)        Past Surgical History:   Procedure Laterality Date    AUGMENTATION  "BREAST Right 8/15/2022    Procedure: AUGMENTATION BREAST;  Surgeon: Costa Case MD;  Location:  MAIN OR;  Service: Plastics    DE MASTOPEXY Bilateral 8/15/2022    Procedure: MASTOPEXY;  Surgeon: Costa Case MD;  Location:  MAIN OR;  Service: Plastics    WISDOM TOOTH EXTRACTION         Family History   Problem Relation Age of Onset    Anxiety disorder Mother     ADD / ADHD Father     Anxiety disorder Sister          Medications have been verified.        Objective   /84   Pulse 84   Temp (!) 97.3 °F (36.3 °C) (Temporal)   Resp 18   Ht 5' 8\" (1.727 m)   Wt 87.5 kg (193 lb)   SpO2 96%   BMI 29.35 kg/m²   No LMP recorded.       Physical Exam     Physical Exam  Constitutional:       General: She is not in acute distress.     Appearance: Normal appearance. She is diaphoretic (mild). She is not toxic-appearing.   HENT:      Right Ear: Tympanic membrane and ear canal normal.      Left Ear: Tympanic membrane and ear canal normal.      Nose: Nose normal. No congestion.      Mouth/Throat:      Mouth: Mucous membranes are moist.      Pharynx: Oropharynx is clear. No posterior oropharyngeal erythema.   Eyes:      General:         Right eye: No discharge.         Left eye: No discharge.      Conjunctiva/sclera: Conjunctivae normal.      Pupils: Pupils are equal, round, and reactive to light.   Cardiovascular:      Rate and Rhythm: Normal rate and regular rhythm.      Heart sounds: Normal heart sounds.   Pulmonary:      Effort: Pulmonary effort is normal.      Breath sounds: Normal breath sounds. No wheezing, rhonchi or rales.   Musculoskeletal:      Cervical back: Normal range of motion and neck supple. No rigidity or tenderness.   Lymphadenopathy:      Cervical: No cervical adenopathy.   Skin:     General: Skin is warm.   Neurological:      Mental Status: She is alert.         Rapid covid was negative.          "

## 2024-01-05 NOTE — LETTER
January 5, 2024     Patient: Sanam Garner   YOB: 2000   Date of Visit: 1/5/2024       To Whom it May Concern:    Sanam Garner was seen in my clinic on 1/5/2024. She may return to school on 01/08/2024 .  Please excuse her from work/school absence on 01/05/24.    If you have any questions or concerns, please don't hesitate to call.         Sincerely,          Mark Allen MD        CC: No Recipients

## 2024-01-05 NOTE — PATIENT INSTRUCTIONS
Body aches and fever-suspect influenza.  Rapid COVID test was negative.  Will treat with antiviral Tamiflu 75 mg - 1 tablet by mouth twice daily for 5 days.  Will send out a flu swab to confirm.  If test comes back negative, may discontinue Tamiflu.  Please check [facility] My[facility] for results.  Continue masking for full 10 days since onset of illness.  Wash hands frequently.  Tylenol on as-needed basis for body aches.  Follow-up if symptoms persist or worsen despite above treatment.

## 2024-01-06 LAB
FLUAV RNA RESP QL NAA+PROBE: NEGATIVE
FLUBV RNA RESP QL NAA+PROBE: NEGATIVE
SARS-COV-2 RNA RESP QL NAA+PROBE: NEGATIVE

## 2024-01-09 ENCOUNTER — HOSPITAL ENCOUNTER (EMERGENCY)
Facility: HOSPITAL | Age: 24
Discharge: HOME | End: 2024-01-09
Attending: EMERGENCY MEDICINE
Payer: COMMERCIAL

## 2024-01-09 VITALS
TEMPERATURE: 99.3 F | DIASTOLIC BLOOD PRESSURE: 70 MMHG | OXYGEN SATURATION: 97 % | HEART RATE: 93 BPM | RESPIRATION RATE: 18 BRPM | SYSTOLIC BLOOD PRESSURE: 116 MMHG

## 2024-01-09 DIAGNOSIS — D69.6 THROMBOCYTOPENIA (HCC): ICD-10-CM

## 2024-01-09 DIAGNOSIS — R74.01 TRANSAMINITIS: ICD-10-CM

## 2024-01-09 DIAGNOSIS — E87.1 HYPONATREMIA: ICD-10-CM

## 2024-01-09 DIAGNOSIS — R50.9 FEVER: Primary | ICD-10-CM

## 2024-01-09 LAB
ALBUMIN SERPL BCP-MCNC: 3.9 G/DL (ref 3.5–5)
ALP SERPL-CCNC: 232 U/L (ref 34–104)
ALT SERPL W P-5'-P-CCNC: 189 U/L (ref 7–52)
ANION GAP SERPL CALCULATED.3IONS-SCNC: 8 MMOL/L
ANISOCYTOSIS BLD QL SMEAR: PRESENT
AST SERPL W P-5'-P-CCNC: 309 U/L (ref 13–39)
BASOPHILS # BLD MANUAL: 0 THOUSAND/UL (ref 0–0.1)
BASOPHILS NFR MAR MANUAL: 0 % (ref 0–1)
BILIRUB SERPL-MCNC: 0.44 MG/DL (ref 0.2–1)
BUN SERPL-MCNC: 9 MG/DL (ref 5–25)
CALCIUM SERPL-MCNC: 8.8 MG/DL (ref 8.4–10.2)
CHLORIDE SERPL-SCNC: 98 MMOL/L (ref 96–108)
CO2 SERPL-SCNC: 25 MMOL/L (ref 21–32)
CREAT SERPL-MCNC: 0.71 MG/DL (ref 0.6–1.3)
EOSINOPHIL # BLD MANUAL: 0.1 THOUSAND/UL (ref 0–0.4)
EOSINOPHIL NFR BLD MANUAL: 2 % (ref 0–6)
ERYTHROCYTE [DISTWIDTH] IN BLOOD BY AUTOMATED COUNT: 13.4 % (ref 11.6–15.1)
GFR SERPL CREATININE-BSD FRML MDRD: 121 ML/MIN/1.73SQ M
GLUCOSE SERPL-MCNC: 94 MG/DL (ref 65–140)
HCG SERPL QL: NEGATIVE
HCT VFR BLD AUTO: 44 % (ref 34.8–46.1)
HGB BLD-MCNC: 14.3 G/DL (ref 11.5–15.4)
LG PLATELETS BLD QL SMEAR: PRESENT
LYMPHOCYTES # BLD AUTO: 1.95 THOUSAND/UL (ref 0.6–4.47)
LYMPHOCYTES # BLD AUTO: 35 % (ref 14–44)
MCH RBC QN AUTO: 28.8 PG (ref 26.8–34.3)
MCHC RBC AUTO-ENTMCNC: 32.5 G/DL (ref 31.4–37.4)
MCV RBC AUTO: 89 FL (ref 82–98)
MONOCYTES # BLD AUTO: 0.24 THOUSAND/UL (ref 0–1.22)
MONOCYTES NFR BLD: 5 % (ref 4–12)
NEUTROPHILS # BLD MANUAL: 2.48 THOUSAND/UL (ref 1.85–7.62)
NEUTS BAND NFR BLD MANUAL: 4 % (ref 0–8)
NEUTS SEG NFR BLD AUTO: 48 % (ref 43–75)
PLATELET # BLD AUTO: 101 THOUSANDS/UL (ref 149–390)
PLATELET BLD QL SMEAR: ABNORMAL
PMV BLD AUTO: 11.2 FL (ref 8.9–12.7)
POTASSIUM SERPL-SCNC: 4.3 MMOL/L (ref 3.5–5.3)
PROT SERPL-MCNC: 7.3 G/DL (ref 6.4–8.4)
RBC # BLD AUTO: 4.96 MILLION/UL (ref 3.81–5.12)
RBC MORPH BLD: PRESENT
SODIUM SERPL-SCNC: 131 MMOL/L (ref 135–147)
VARIANT LYMPHS # BLD AUTO: 6 %
WBC # BLD AUTO: 4.76 THOUSAND/UL (ref 4.31–10.16)

## 2024-01-09 PROCEDURE — 84703 CHORIONIC GONADOTROPIN ASSAY: CPT | Performed by: EMERGENCY MEDICINE

## 2024-01-09 PROCEDURE — 85007 BL SMEAR W/DIFF WBC COUNT: CPT | Performed by: EMERGENCY MEDICINE

## 2024-01-09 PROCEDURE — 99284 EMERGENCY DEPT VISIT MOD MDM: CPT | Performed by: EMERGENCY MEDICINE

## 2024-01-09 PROCEDURE — 99283 EMERGENCY DEPT VISIT LOW MDM: CPT

## 2024-01-09 PROCEDURE — 86308 HETEROPHILE ANTIBODY SCREEN: CPT | Performed by: EMERGENCY MEDICINE

## 2024-01-09 PROCEDURE — 85027 COMPLETE CBC AUTOMATED: CPT | Performed by: EMERGENCY MEDICINE

## 2024-01-09 PROCEDURE — 80053 COMPREHEN METABOLIC PANEL: CPT | Performed by: EMERGENCY MEDICINE

## 2024-01-09 PROCEDURE — 36415 COLL VENOUS BLD VENIPUNCTURE: CPT | Performed by: EMERGENCY MEDICINE

## 2024-01-09 NOTE — Clinical Note
Sanam Garner was seen and treated in our emergency department on 1/9/2024.    No restrictions            Diagnosis:     Sanam  may return to school on return date.    She may return on this date: 01/12/2024         If you have any questions or concerns, please don't hesitate to call.      Fatoumata Alcala RN    ______________________________           _______________          _______________  Hospital Representative                              Date                                Time Replace prn

## 2024-01-09 NOTE — ED PROVIDER NOTES
Pt Name: Sanam Garner  MRN: [id number]  Birthdate 2000  Age/Sex: 23 y.o. female  Date of evaluation: 1/9/2024  PCP: Daniel Spatz    CHIEF COMPLAINT    Chief Complaint   Patient presents with    Fever     Pt c/o fever since Thursday with bodyaches, leg pain, back pain, chills. Pt reports she just started her birth control again on Monday before the fever. Pt reports testing negative for flu and covid twice at urgent care.          HPI    23 y.o. female presenting with fever, body aches, leg pain, back pain, chills.  Patient states this is been going on over the past 6 days, she has been seen twice at an urgent care and has tested negative for flu and COVID at both visits.  Patient states she restarted Paulina for birth control about 3 days before her symptoms began.  She also complains of generalized fatigue and malaise as well as sweats and chills.  She denies cough, shortness of breath, chest pain, leg pain or swelling, trauma, abdominal pain, nausea, vomiting, diarrhea, other symptoms.  Patient previously healthy, up-to-date on immunizations.      HPI      Past Medical and Surgical History    Past Medical History:   Diagnosis Date    Acid reflux     ADHD (attention deficit hyperactivity disorder)     Anxiety     GERD (gastroesophageal reflux disease)        Past Surgical History:   Procedure Laterality Date    AUGMENTATION BREAST Right 8/15/2022    Procedure: AUGMENTATION BREAST;  Surgeon: Costa Case MD;  Location:  MAIN OR;  Service: Plastics    IA MASTOPEXY Bilateral 8/15/2022    Procedure: MASTOPEXY;  Surgeon: Costa Case MD;  Location:  MAIN OR;  Service: Plastics    WISDOM TOOTH EXTRACTION         Family History   Problem Relation Age of Onset    Anxiety disorder Mother     ADD / ADHD Father     Anxiety disorder Sister        Social History     Tobacco Use    Smoking status: Never    Smokeless tobacco: Never   Vaping Use    Vaping status: Former   Substance Use Topics    Alcohol use: Yes      Alcohol/week: 2.0 standard drinks of alcohol     Types: 2 Standard drinks or equivalent per week     Comment: social 2 drinks every other week    Drug use: Not Currently           Allergies    No Known Allergies    Home Medications    Prior to Admission medications    Medication Sig Start Date End Date Taking? Authorizing Provider   cholecalciferol (VITAMIN D3) 1,000 units tablet Take 1,000 Units by mouth daily  Patient not taking: Reported on 1/5/2024    Historical Provider, MD   clindamycin (CLEOCIN T) 1 % lotion Apply 1 Application topically in the morning  Patient not taking: Reported on 1/5/2024 10/19/23   Historical Provider, MD   [employer] FIBER GUMMIES PO Take by mouth  Patient not taking: Reported on 11/8/2023    Historical Provider, MD   drospirenone-ethinyl estradiol (SANJUANITA) 3-0.02 MG per tablet Take 1 tablet by mouth daily 12/27/23 12/26/24  Historical Provider, MD   FLUoxetine (PROzac) 10 mg capsule TAKE 1 CAPSULE (10 MG TOTAL) BY MOUTH DAILY FOR 14 DAYS, THEN EVERY OTHER DAY UNTIL GONE  Patient not taking: Reported on 1/5/2024 12/4/23   Juan Hendricks MD   methylphenidate (CONCERTA) 18 mg ER tablet Take 18 mg by mouth if needed  Patient not taking: Reported on 11/8/2023    Historical Provider, MD   Multiple Vitamin (multivitamin) capsule Take 1 capsule by mouth daily  Patient not taking: Reported on 1/5/2024    Historical Provider, MD   oseltamivir (TAMIFLU) 75 mg capsule Take 1 capsule (75 mg total) by mouth every 12 (twelve) hours for 5 days 1/5/24 1/10/24  Mark Allen MD           Review of Systems    Review of Systems   Constitutional:  Positive for chills, fatigue and fever. Negative for activity change.   HENT:  Negative for drooling and facial swelling.    Eyes:  Negative for pain, discharge and visual disturbance.   Respiratory:  Negative for apnea, cough, chest tightness, shortness of breath and wheezing.    Cardiovascular:  Negative for chest pain and leg swelling.    Gastrointestinal:  Negative for abdominal pain, constipation, diarrhea, nausea and vomiting.   Genitourinary:  Negative for difficulty urinating, dysuria and urgency.   Musculoskeletal:  Positive for myalgias. Negative for arthralgias, back pain and gait problem.   Skin:  Negative for color change and rash.   Neurological:  Negative for dizziness, speech difficulty, weakness and headaches.   Psychiatric/Behavioral:  Negative for agitation, behavioral problems and confusion.            All other systems reviewed and negative.    Physical Exam      ED Triage Vitals [01/09/24 1826]   Temperature Pulse Respirations Blood Pressure SpO2   99.3 °F (37.4 °C) 93 18 116/70 97 %      Temp Source Heart Rate Source Patient Position - Orthostatic VS BP Location FiO2 (%)   Oral Monitor Sitting Right arm --      Pain Score       --               Physical Exam  Vitals and nursing note reviewed.   Constitutional:       General: She is not in acute distress.     Appearance: She is well-developed. She is not ill-appearing, toxic-appearing or diaphoretic.   HENT:      Head: Normocephalic and atraumatic.      Right Ear: External ear normal.      Left Ear: External ear normal.      Nose: Nose normal. No congestion or rhinorrhea.      Mouth/Throat:      Mouth: Mucous membranes are moist.      Pharynx: Oropharynx is clear. No oropharyngeal exudate or posterior oropharyngeal erythema.   Eyes:      Conjunctiva/sclera: Conjunctivae normal.      Pupils: Pupils are equal, round, and reactive to light.   Cardiovascular:      Rate and Rhythm: Normal rate and regular rhythm.      Pulses: Normal pulses.      Heart sounds: Normal heart sounds. No murmur heard.     No friction rub. No gallop.   Pulmonary:      Effort: Pulmonary effort is normal. No respiratory distress.      Breath sounds: Normal breath sounds. No wheezing or rales.   Abdominal:      General: There is no distension.      Palpations: Abdomen is soft.      Tenderness: There is no  abdominal tenderness. There is no guarding or rebound.   Musculoskeletal:         General: No deformity. Normal range of motion.      Cervical back: Normal range of motion and neck supple. No rigidity or tenderness.      Right lower leg: No edema.      Left lower leg: No edema.      Comments: No swelling or tenderness to either calf, no tenderness overlying the deep venous system of the lower legs   Skin:     General: Skin is warm and dry.      Capillary Refill: Capillary refill takes less than 2 seconds.      Findings: No erythema or rash.   Neurological:      Mental Status: She is alert and oriented to person, place, and time.      Cranial Nerves: No cranial nerve deficit.      Sensory: No sensory deficit.      Motor: No weakness.      Coordination: Coordination normal.      Gait: Gait normal.   Psychiatric:         Behavior: Behavior normal.         Thought Content: Thought content normal.         Judgment: Judgment normal.              Diagnostic Results      Labs:    Results Reviewed       Procedure Component Value Units Date/Time    RBC Morphology Reflex Test [[id number]] Collected: 01/09/24 1922    Lab Status: Final result Specimen: Blood from Arm, Left Updated: 01/09/24 2101    CBC and differential [[id number]]  (Abnormal) Collected: 01/09/24 1922    Lab Status: Final result Specimen: Blood from Arm, Left Updated: 01/09/24 2018     WBC 4.76 Thousand/uL      RBC 4.96 Million/uL      Hemoglobin 14.3 g/dL      Hematocrit 44.0 %      MCV 89 fL      MCH 28.8 pg      MCHC 32.5 g/dL      RDW 13.4 %      MPV 11.2 fL      Platelets 101 Thousands/uL     Manual Differential(PHLEBS Do Not Order) [[id number]]  (Abnormal) Collected: 01/09/24 1922    Lab Status: Final result Specimen: Blood from Arm, Left Updated: 01/09/24 2018     Segmented % 48 %      Bands % 4 %      Lymphocytes % 35 %      Monocytes % 5 %      Eosinophils, % 2 %      Basophils % 0 %      Atypical Lymphocytes % 6 %      Absolute Neutrophils 2.48  Thousand/uL      Lymphocytes Absolute 1.95 Thousand/uL      Monocytes Absolute 0.24 Thousand/uL      Eosinophils Absolute 0.10 Thousand/uL      Basophils Absolute 0.00 Thousand/uL      Total Counted --     RBC Morphology Present     Platelet Estimate Borderline     Large Platelet Present     Anisocytosis Present    hCG, qualitative pregnancy [[id number]]  (Normal) Collected: 01/09/24 1922    Lab Status: Final result Specimen: Blood from Arm, Left Updated: 01/2001     Preg, Serum Negative    Comprehensive metabolic panel [[id number]]  (Abnormal) Collected: 01/09/24 1922    Lab Status: Final result Specimen: Blood from Arm, Left Updated: 01/09/24 1955     Sodium 131 mmol/L      Potassium 4.3 mmol/L      Chloride 98 mmol/L      CO2 25 mmol/L      ANION GAP 8 mmol/L      BUN 9 mg/dL      Creatinine 0.71 mg/dL      Glucose 94 mg/dL      Calcium 8.8 mg/dL       U/L       U/L      Alkaline Phosphatase 232 U/L      Total Protein 7.3 g/dL      Albumin 3.9 g/dL      Total Bilirubin 0.44 mg/dL      eGFR 121 ml/min/1.73sq m     Narrative:      National Kid[employer] Disease Foundation guidelines for Chronic Kidney Disease (CKD):     Stage 1 with normal or high GFR (GFR > 90 mL/min/1.73 square meters)    Stage 2 Mild CKD (GFR = 60-89 mL/min/1.73 square meters)    Stage 3A Moderate CKD (GFR = 45-59 mL/min/1.73 square meters)    Stage 3B Moderate CKD (GFR = 30-44 mL/min/1.73 square meters)    Stage 4 Severe CKD (GFR = 15-29 mL/min/1.73 square meters)    Stage 5 End Stage CKD (GFR <15 mL/min/1.73 square meters)  Note: GFR calculation is accurate only with a steady state creatinine    Mononucleosis screen [[id number]] Collected: 01/09/24 1922    Lab Status: In process Specimen: Blood from Arm, Left Updated: 01/09/24 1926            All labs reviewed and utilized in the medical decision making process    Radiology:    No orders to display       All radiology studies independently viewed by me and interpreted by the  radiologist.    Procedure    Procedures        ED Course of Care and Re-Assessments      After discussion of risks and benefits, patient and mother requesting blood work.  Screening blood work performed, remarkable for mild hyponatremia as well as transaminitis and elevated alkaline phosphatase and thrombocytopenia.  Discussed these findings in depth with patient and her mother.  We discussed further workup in the emergency department to include advanced imaging with a CT scan but patient declined at this time, preferring close follow-up with her primary care doctor.  Patient was also offered resuscitation with IV fluids to correct sodium but declined that as well.  Reinforced need for close interval follow-up to recheck labs and trend abnormalities, counseled regarding home measures to correct hyponatremia to include increasing sodium intake as well as proper hydration.    Medications - No data to display        FINAL IMPRESSION    Final diagnoses:   Fever   Transaminitis   Thrombocytopenia (HCC) - mild   Hyponatremia - mild         DISPOSITION/PLAN    Presentation as above with fever for 6 days as well as transaminitis thrombocytopenia and hyponatremia.  Vital signs reassuring, examination nonspecific.  Abdomen nontender and benign, low suspicion for cholecystitis, appendicitis, small bowel obstruction, other acute surgical process.  Transaminitis noted, but normal bilirubin reassuring, low suspicion for obstructive process to include choledocholithiasis, primary biliary cirrhosis or primary sclerosing cholangitis.  At this time, differential is broad, including autoimmune process to include autoimmune hepatitis, viral syndrome with acute phase reactants to include possibility of mononucleosis, or other inflammatory process.  Low suspicion for acute bacterial infection.  Malignancy is considered but felt to be relatively unlikely given presentation.  Differential discussed in detail with the patient and her  mother, as above, further resuscitation as well as further workup with advanced imaging offered but declined at this time.  Discharged with strict return precautions, follow-up with primary care doctor.  Encouraged to return to the ER for recheck if close interval follow-up with primary care doctor is not feasible or cannot be accomplished.  Time reflects when diagnosis was documented in both MDM as applicable and the Disposition within this note       Time User Action Codes Description Comment    1/9/2024  8:30 PM Kevin Mason Add [R50.9] Fever     1/9/2024  8:31 PM Kevin Mason Add [R74.01] Transaminitis     1/9/2024  8:31 PM Kevin Mason Add [D69.6] Thrombocytopenia (HCC)     1/9/2024  8:31 PM Kevin Mason Add [E87.1] Hyponatremia     1/9/2024  8:31 PM Kevin Mason Modify [D69.6] Thrombocytopenia (HCC) mild    1/9/2024  8:31 PM Kevin Mason Modify [E87.1] Hyponatremia mild          ED Disposition       ED Disposition   Discharge    Condition   Stable    Date/Time   Tue Jan 9, 2024  8:30 PM    Comment   Sanam Garner discharge to home/self care.                   Follow-up Information       Follow up With Specialties Details Why Contact Info Additional Information    [facility] Emergency Medicine Go to  If symptoms worsen [facility] [address]  [phone] [facility], [facility] [place], [place], [address]    [facility] Call in 1 day To discuss this visit and schedule close outpatient follow-up.  We recommend repeating your lab work in the next 3 to 5 days to check on the transaminases, your sodium, and your platelets. [address] [address] [phone]                 PATIENT REFERRED TO:    [facility]  [facility]  [id number] [phone]  Go to   If symptoms worsen    Daniel Spatz  [address]  [address]  [facility] [address] [phone]    Call in 1 day  To discuss this visit and schedule close outpatient follow-up.  We recommend repeating your lab work in the next 3 to 5 days to check on the transaminases, your sodium, and your platelets.      DISCHARGE MEDICATIONS:    Discharge Medication List as of 1/9/2024  8:33 PM        CONTINUE these medications which have NOT CHANGED    Details   cholecalciferol (VITAMIN D3) 1,000 units tablet Take 1,000 Units by mouth daily, Historical Med      clindamycin (CLEOCIN T) 1 % lotion Apply 1 Application topically in the morning, Starting Thu 10/19/2023, Historical Med      [employer] FIBER GUMMIES PO Take by mouth, Historical Med      drospirenone-ethinyl estradiol (SANJUANITA) 3-0.02 MG per tablet Take 1 tablet by mouth daily, Starting Wed 12/27/2023, Until Thu 12/26/2024, Historical Med      FLUoxetine (PROzac) 10 mg capsule TAKE 1 CAPSULE (10 MG TOTAL) BY MOUTH DAILY FOR 14 DAYS, THEN EVERY OTHER DAY UNTIL GONE, Starting Mon 12/4/2023, Normal      methylphenidate (CONCERTA) 18 mg ER tablet Take 18 mg by mouth if needed, Historical Med      Multiple Vitamin (multivitamin) capsule Take 1 capsule by mouth daily, Historical Med      oseltamivir (TAMIFLU) 75 mg capsule Take 1 capsule (75 mg total) by mouth every 12 (twelve) hours for 5 days, Starting Fri 1/5/2024, Until Wed 1/10/2024, Normal             No discharge procedures on file.         Kevin Mason MD    Portions of the record may have been created with voice recognition software.  Occasional wrong word or \"sound alike\" substitutions may have occurred due to the inherent limitations of voice recognition software.  Please read the chart carefully and recognize, using context, where substitutions have occurred     Kevin Mason MD  01/10/24 0040    "

## 2024-01-09 NOTE — Clinical Note
Sanam Garner was seen and treated in our emergency department on 1/9/2024.    No restrictions            Diagnosis:     Sanam  may return to school on return date.    She may return on this date: 01/12/2024         If you have any questions or concerns, please don't hesitate to call.      Fatoumata Alcala RN    ______________________________           _______________          _______________  Hospital Representative                              Date                                Time

## 2024-01-10 LAB — HETEROPH AB SER QL: POSITIVE

## 2024-01-29 LAB
EXTERNAL HIV CONFIRMATION: NORMAL
EXTERNAL HIV SCREEN: NORMAL
HCV AB SER-ACNC: NEGATIVE

## 2024-02-14 ENCOUNTER — TELEPHONE (OUTPATIENT)
Dept: PSYCHIATRY | Facility: CLINIC | Age: 24
End: 2024-02-14

## 2024-02-14 NOTE — TELEPHONE ENCOUNTER
Left Sanam a message to return my call.  I received her medical records from [facility] and wanted to discuss.

## 2024-02-16 ENCOUNTER — TELEMEDICINE (OUTPATIENT)
Dept: PSYCHIATRY | Facility: CLINIC | Age: 24
End: 2024-02-16

## 2024-02-16 DIAGNOSIS — Z13.39 ADHD (ATTENTION DEFICIT HYPERACTIVITY DISORDER) EVALUATION: Primary | ICD-10-CM

## 2024-02-16 RX ORDER — PANTOPRAZOLE SODIUM 40 MG/1
40 TABLET, DELAYED RELEASE ORAL DAILY
COMMUNITY
Start: 2024-01-12 | End: 2024-04-11

## 2024-02-16 RX ORDER — METHYLPHENIDATE HYDROCHLORIDE 18 MG/1
18 TABLET ORAL DAILY PRN
Qty: 30 TABLET | Refills: 0 | Status: SHIPPED | OUTPATIENT
Start: 2024-02-16 | End: 2024-02-22 | Stop reason: SDUPTHER

## 2024-02-16 NOTE — PSYCH
Virtual Regular Visit    Verification of patient location:    Patient is located in the following state in which I hold an active license [place]    Problem List Items Addressed This Visit     ADHD (attention deficit hyperactivity disorder) evaluation - Primary    Relevant Medications    methylphenidate (CONCERTA) 18 mg ER tablet          Encounter provider MARVA Posey    Provider located at    [facility]  [address] [facility] [place] [address] [phone]    Recent Visits  Date Type Provider Dept   02/14/24 Telephone MARVA Posey [facility]   Showing recent visits within past 7 days and meeting all other requirements  Today's Visits  Date Type Provider Dept   02/16/24 Telemedicine MARVA Posey [facility]   Showing today's visits and meeting all other requirements  Future Appointments  No visits were found meeting these conditions.  Showing future appointments within next 150 days and meeting all other requirements           The patient was identified by name and date of birth. Patient was informed that this is a telemedicine visit and that the visit is being conducted throughthe Epic Embedded platform. She agrees to proceed..  My office door was closed. No one else was in the room.  She acknowledged consent and understanding of privacy and security of the video platform. The patient has agreed to participate and understands they can discontinue the visit at any time.    Patient is aware this is a billable service.     HPI     Current Outpatient Medications   Medication Sig Dispense Refill   • drospirenone-ethinyl estradiol (SANJUANITA) 3-0.02 MG per tablet Take 1 tablet by mouth daily     • methylphenidate (CONCERTA) 18 mg ER tablet Take 1 tablet (18 mg total) by mouth daily as needed (for ADHD symptoms) Max Daily Amount: 18 mg 30 tablet 0   • pantoprazole (PROTONIX) 40 mg tablet Take 40 mg by mouth  daily       No current facility-administered medications for this visit.       Review of Systems  Video Exam    There were no vitals filed for this visit.    Physical Exam   As a result of this visit, I have referred the patient for further respiratory evaluation. No    I spent 15 minutes directly with the patient during this visit  VIRTUAL VISIT DISCLAIMER    Sanam Garner acknowledges that she has consented to an online visit or consultation. She understands that the online visit is based solely on information provided by her, and that, in the absence of a face-to-face physical evaluation by the physician, the diagnosis she receives is both limited and provisional in terms of accuracy and completeness. This is not intended to replace a full medical face-to-face evaluation by the physician. Sanam Garner understands and accepts these terms.    MEDICATION MANAGEMENT NOTE        [facility]    Name and Date of Birth:  Sanam Garner 23 y.o. 2000 MRN: [id number]    Date of Visit: February 16, 2024    No Known Allergies    Visit Time    Visit Start Time: 1430  Visit Stop Time: 1445  Total Visit Duration:  15 minutes    SUBJECTIVE:    Sanam is seen today for a follow up for ADHD. She continues to experience on and off symptoms since the last visit.  Michael seen virtually today for medication management follow-up.  She was last seen by this provider on 12/11/2023.  This provider did receive records of her previous provider appointments.  She originally started on Ritalin prescribed by her pediatrician during school at age 16 which did help her to bring up her grades at that time.  Her doctor through [facility] did switch her over to Concerta which was also effective for her.  She stopped the Concerta as she was no longer in school and did not need it at the time.  Her records corroborate her original interview, per my HPI on 11/8/2023:    Sanam is a 23 year old single  female who currently lives with her parents. 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 She reports that at age 16 she was having trouble in school and had the inability to focus, and had trouble sitting in one place focus.  She was messy, disorganized, could not finish her homework, and her grades were suffering.  She states that she saw a pediatrician at age 16 and was evaluated for ADHD.  She was given Concerta and was much improved with that medication. When she stopped school, she stopped using the medication and has been fine since then. She did not have an IEP.  She did go back to see a PA-C in August 2023 following the break up with her boyfriend and when she was starting to have trouble focusing in Nursing School but was only given Prozac at the time, which she states did help with her handling of the break up but has not helped her with her focus and attention.  She is still messy and disorganized in school and has trouble focusing.  She was hopeful that she would be able to be prescribed Concerta for her inattention at this time.      She currently denies any depression and is happy and outgoing at school and with friends. Reports that she has normal anxiety that is manageable.  She denies any previous symptoms of sully or psychosis.  Denies any suicide attempts. Denies any current SI/HI.  Denies past hospitalizations.  Reports that her dad has ADHD and mom has anxiety.  Has 2 younger sisters, one with anxiety.  She is calm, pleasant, and appropriate in conversation.  She agreed to sign an BELGICA for her previous doctor who evaluated her for the ADHD and prescribed the Concerta. She also agreed to have an EKG done before being prescribed the stimulant.  She will follow up in one month.     She did have her EKG done which was within normal limits.  She agrees to see this provider in person every 3 to 6 months.  She continues to have ongoing  attention concerns, as she is in nursing school currently.  She has trouble focusing and paying attention during class, and at home.  She denies depression and occasionally has some anxiety rated at 3 out of 10 currently.  She denies suicidal and homicidal ideation, denies auditory and visual hallucinations.  She reports good sleep, good appetite.  We will restart her Concerta at 18 mg p.o. daily.  We will follow-up in 3 months.  She will call sooner with concerns or issues if they arise prior to scheduled appointment.      Stimulant PARQ completed including elevated heart rate, elevated bp, seizures, anxiety/irritability, activation/induction of sully, abuse potential, interactions with other medications, risk of sudden death, appetite suppression/weight loss discussed.      She denies any side effects from current medications.    PLAN:  Initiate Concerta 18 mg p.o. daily  Follow-up in 3 months  She will call sooner with concerns or issues if they arise prior to scheduled appointment    Aware of 24 hour and weekend coverage for urgent situations accessed by calling [facility] main practice number  Medication management every 3 months  Aware of need to follow up with family physician for medical issues    Diagnoses and all orders for this visit:    ADHD (attention deficit hyperactivity disorder) evaluation  -     methylphenidate (CONCERTA) 18 mg ER tablet; Take 1 tablet (18 mg total) by mouth daily as needed (for ADHD symptoms) Max Daily Amount: 18 mg    Other orders  -     pantoprazole (PROTONIX) 40 mg tablet; Take 40 mg by mouth daily      Current Outpatient Medications on File Prior to Visit   Medication Sig Dispense Refill   • drospirenone-ethinyl estradiol (SANJUANITA) 3-0.02 MG per tablet Take 1 tablet by mouth daily     • pantoprazole (PROTONIX) 40 mg tablet Take 40 mg by mouth daily     • [DISCONTINUED] cholecalciferol (VITAMIN D3) 1,000 units tablet Take 1,000 Units by mouth daily (Patient  not taking: Reported on 1/5/2024)     • [DISCONTINUED] clindamycin (CLEOCIN T) 1 % lotion Apply 1 Application topically in the morning (Patient not taking: Reported on 1/5/2024)     • [DISCONTINUED] [employer] FIBER GUMMIES PO Take by mouth (Patient not taking: Reported on 11/8/2023)     • [DISCONTINUED] FLUoxetine (PROzac) 10 mg capsule TAKE 1 CAPSULE (10 MG TOTAL) BY MOUTH DAILY FOR 14 DAYS, THEN EVERY OTHER DAY UNTIL GONE (Patient not taking: Reported on 1/5/2024) 21 capsule 0   • [DISCONTINUED] methylphenidate (CONCERTA) 18 mg ER tablet Take 18 mg by mouth if needed (Patient not taking: Reported on 11/8/2023)     • [DISCONTINUED] Multiple Vitamin (multivitamin) capsule Take 1 capsule by mouth daily       No current facility-administered medications on file prior to visit.       Psychotherapy Provided:     Individual psychotherapy provided: Yes  Counseling was provided during the session today for 16 minutes.  Supportive counseling provided.  Medication changes discussed with Sanam.  Medication education provided to Sanam.  Recent stressors discussed with Sanam including school stress.  Coping strategies reviewed with Sanam.   Importance of medication and treatment compliance reviewed with Sanam.  Importance of follow up with family physician for medical issues reviewed with Sanam.  Reassurance and supportive therapy provided.   Crisis/safety plan discussed with Sanam. Patient will call prior to scheduled appointment if they have any issues or concerns.  Patient understands they can access the office by calling the main number at any time if they are in crisis.  They also understand they can call their county's crisis number or go to their nearest ED if suicidal ideation increases or if they develop a plan or intent.       HPI ROS Appetite Changes and Sleep:     She reports normal sleep, normal appetite, normal energy level. Denies homicidal ideation, denies suicidal ideation    Review Of Systems:      HPI ROS:                Medication Side Effects:  denies   Depression (10 worst): 0/10   Anxiety (10 worst): 3/10   Safety concerns (SI, HI, etc): denies   Sleep: good   Energy: good   Appetite: good     General normal    Personality no change in personality   Constitutional negative   ENT negative   Cardiovascular negative   Respiratory negative   Gastrointestinal negative   Genitourinary negative   Musculoskeletal negative   Integumentary negative   Neurological negative   Endocrine negative   Other Symptoms none, all other systems are negative     Mental Status Evaluation:    Appearance Appropriately dressed and Good eye contact   Behavior calm and cooperative   Mood euthymic  Depression Scale - 0 of 10 (0 = No depression)  Anxiety Scale - 3 of 10 (0 = No anxiety)   Speech Normal rate and volume   Affect appropriate and mood-congruent   Thought Processes Goal directed and coherent   Thought Content Does not verbalize delusional material   Associations Tightly connected   Perceptual Disturbances Denies hallucinations and does not appear to be responding to internal stimuli   Risk Potential Suicidal/Homicidal Ideation - No evidence of suicidal or homicidal ideation and patient does not verbalize suicidal or homicidal ideation  Risk of Violence - No evidence of risk for violence found on assessment  Risk of Self Mutilation - No evidence of risk for self mutilation found on assessment   Orientation oriented to person, place, time/date, and situation   Memory recent and remote memory grossly intact   Consciousness alert and awake   Attention/Concentration attention span and concentration are age appropriate   Insight intact   Judgement intact   Muscle Strength and Gait normal muscle strength and normal muscle tone, normal gait/station and normal balance   Motor Activity no abnormal movements   Language no difficulty naming common objects, no difficulty repeating a phrase, no difficulty writing a sentence   Fund of Knowledge adequate  knowledge of current events  adequate fund of knowledge regarding past history  adequate fund of knowledge regarding vocabulary      Past Psychiatric History Update:     Inpatient Psychiatric Admission Since Last Encounter:   no  Changes to Outpatient Psychiatric Treatment Team:    no  Suicide Attempt Or Self Mutilation Since Last Encounter:   no  Incidence of Violent Behavior Since Last Encounter:   no    Traumatic History Update:     New Onset of Abuse Since Last Encounter:   no  Traumatic Events Since Last Encounter:   no    Past Medical History:    Past Medical History:   Diagnosis Date   • Acid reflux    • ADHD (attention deficit hyperactivity disorder)    • Anxiety    • GERD (gastroesophageal reflux disease)         Past Surgical History:   Procedure Laterality Date   • AUGMENTATION BREAST Right 8/15/2022    Procedure: AUGMENTATION BREAST;  Surgeon: Costa Case MD;  Location:  MAIN OR;  Service: Plastics   • PA MASTOPEXY Bilateral 8/15/2022    Procedure: MASTOPEXY;  Surgeon: Costa Case MD;  Location:  MAIN OR;  Service: Plastics   • WISDOM TOOTH EXTRACTION       No Known Allergies  Substance Abuse History:    Social History     Substance and Sexual Activity   Alcohol Use Yes   • Alcohol/week: 2.0 standard drinks of alcohol   • Types: 2 Standard drinks or equivalent per week    Comment: social 2 drinks every other week     Social History     Substance and Sexual Activity   Drug Use Not Currently     Social History:    Social History     Socioeconomic History   • Marital status: Single     Spouse name: Not on file   • Number of children: Not on file   • Years of education: some college   • Highest education level: Some college, no degree   Occupational History     Employer: [employer]   Tobacco Use   • Smoking status: Never   • Smokeless tobacco: Never   Vaping Use   • Vaping status: Former   Substance and Sexual Activity   • Alcohol use: Yes     Alcohol/week: 2.0 standard drinks of  alcohol     Types: 2 Standard drinks or equivalent per week     Comment: social 2 drinks every other week   • Drug use: Not Currently   • Sexual activity: Not Currently   Other Topics Concern   • Not on file   Social History Narrative   • Not on file     Social Determinants of Health     Financial Resource Strain: Not on file   Food Insecurity: Not on file   Transportation Needs: Not on file   Physical Activity: Not on file   Stress: Not on file   Social Connections: Not on file   Intimate Partner Violence: Not on file   Housing Stability: Not on file     Family Psychiatric History:     Family History   Problem Relation Age of Onset   • Anxiety disorder Mother    • ADD / ADHD Father    • Anxiety disorder Sister      History Review:The following portions of the patient's history were reviewed and updated as appropriate: allergies, current medications, past family history, past medical history, past social history, past surgical history, and problem list     OBJECTIVE:     Vital signs in last 24 hours:    There were no vitals filed for this visit.  Laboratory Results: Recent Labs (last 2 months):   Admission on 01/09/2024, Discharged on 01/09/2024   Component Date Value   • WBC 01/09/2024 4.76    • RBC 01/09/2024 4.96    • Hemoglobin 01/09/2024 14.3    • Hematocrit 01/09/2024 44.0    • MCV 01/09/2024 89    • MCH 01/09/2024 28.8    • MCHC 01/09/2024 32.5    • RDW 01/09/2024 13.4    • MPV 01/09/2024 11.2    • Platelets 01/09/2024 101 (L)    • Sodium 01/09/2024 131 (L)    • Potassium 01/09/2024 4.3    • Chloride 01/09/2024 98    • CO2 01/09/2024 25    • ANION GAP 01/09/2024 8    • BUN 01/09/2024 9    • Creatinine 01/09/2024 0.71    • Glucose 01/09/2024 94    • Calcium 01/09/2024 8.8    • AST 01/09/2024 309 (H)    • ALT 01/09/2024 189 (H)    • Alkaline Phosphatase 01/09/2024 232 (H)    • Total Protein 01/09/2024 7.3    • Albumin 01/09/2024 3.9    • Total Bilirubin 01/09/2024 0.44    • eGFR 01/09/2024 121    • Monotest  01/09/2024 Positive (A)    • Preg, Serum 01/09/2024 Negative    • Segmented % 01/09/2024 48    • Bands % 01/09/2024 4    • Lymphocytes % 01/09/2024 35    • Monocytes % 01/09/2024 5    • Eosinophils, % 01/09/2024 2    • Basophils % 01/09/2024 0    • Atypical Lymphocytes % 01/09/2024 6 (H)    • Absolute Neutrophils 01/09/2024 2.48    • Lymphocytes Absolute 01/09/2024 1.95    • Monocytes Absolute 01/09/2024 0.24    • Eosinophils Absolute 01/09/2024 0.10    • Basophils Absolute 01/09/2024 0.00    • RBC Morphology 01/09/2024 Present    • Platelet Estimate 01/09/2024 Borderline (A)    • Large Platelet 01/09/2024 Present    • Anisocytosis 01/09/2024 Present    Office Visit on 01/05/2024   Component Date Value   • POCT SARS-CoV-2 Ag 01/05/2024 Negative    • VALID CONTROL 01/05/2024 Valid    • SARS-CoV-2 01/05/2024 Negative    • INFLUENZA A PCR 01/05/2024 Negative    • INFLUENZA B PCR 01/05/2024 Negative      I have personally reviewed all pertinent laboratory/tests results.    Suicide/Homicide Risk Assessment:    Risk of Harm to Self:  The following ratings are based on assessment at the time of the interview  Recent Specific Risk Factors include: none  Demographic risk factors include: , age: young adult (15-24)  Historical Risk Factors include: history of anxiety  Protective Factors: no current suicidal ideation  Weapons: none. The following steps have been taken to ensure weapons are properly secured: not applicable  Based on today's assessment, aSnam presents the following risk of harm to self: none    Risk of Harm to Others:  The following ratings are based on assessment at the time of the interview  Protective Factors: no current homicidal ideation  Based on today's assessment, Sanam presents the following risk of harm to others: none    The following interventions are recommended: contracts for safety at present - agrees to go to ED if feeling unsafe, contracts for safety at present - agrees to call Crisis  Intervention Service if feeling unsafe    Medications Risks/Benefits:      Risks, Benefits And Possible Side Effects Of Medications:    Discussed risks and benefits of treatment with patient including risks of cardiovascular side effects including elevated blood pressure, risk of misuse, abuse or dependence and risk of increased anxiety related to treatment with stimulant medications     Controlled Medication Discussion:     Sanam has been filling controlled prescriptions on time as prescribed according to [place] Prescription Drug Monitoring Program    Treatment Plan:    Due for update/Updated:   yes  Last treatment plan done 2/16/24 by MARVA Ruiz.  Treatment Plan due on 8/16/24.    MARVA Posey 02/16/24    This note was shared with patient.

## 2024-02-16 NOTE — BH TREATMENT PLAN
TREATMENT PLAN (Medication Management Only)        [facility]    Name and Date of Birth:  Sanam Garner 23 y.o. 2000  Date of Treatment Plan: February 16, 2024  Diagnosis/Diagnoses:    1. ADHD (attention deficit hyperactivity disorder) evaluation      Strengths/Personal Resources for Self-Care: taking medications as prescribed.  Area/Areas of need (in own words): ADHD symptoms  1. Long Term Goal: improve control of ADHD symptoms.  Target Date:6 months - 8/16/2024  Person/Persons responsible for completion of goal: Sanam  2.  Short Term Objective (s) - How will we reach this goal?:   A. Provider new recommended medication/dosage changes and/or continue medication(s): continue current medications as prescribed.  B. Take psychiatric medications responsibly.  C. Try relaxation techniques.  Target Date:6 months - 8/16/2024  Person/Persons Responsible for Completion of Goal: Sanam  Progress Towards Goals: initiating treatment  Treatment Modality: medication management every 3 months, medication education at every visit  Review due 180 days from date of this plan: 6 months - 8/16/2024  Expected length of service: ongoing treatment  My Physician/PA/NP and I have developed this plan together and I agree to work on the goals and objectives. I understand the treatment goals that were developed for my treatment.       Resident

## 2024-02-22 DIAGNOSIS — Z13.39 ADHD (ATTENTION DEFICIT HYPERACTIVITY DISORDER) EVALUATION: ICD-10-CM

## 2024-02-23 RX ORDER — METHYLPHENIDATE HYDROCHLORIDE 18 MG/1
18 TABLET ORAL DAILY PRN
Qty: 30 TABLET | Refills: 0 | Status: SHIPPED | OUTPATIENT
Start: 2024-02-23

## 2024-03-21 DIAGNOSIS — Z13.39 ADHD (ATTENTION DEFICIT HYPERACTIVITY DISORDER) EVALUATION: ICD-10-CM

## 2024-03-22 RX ORDER — METHYLPHENIDATE HYDROCHLORIDE 18 MG/1
18 TABLET ORAL DAILY PRN
Qty: 30 TABLET | Refills: 0 | Status: SHIPPED | OUTPATIENT
Start: 2024-03-24 | End: 2024-03-26 | Stop reason: SDUPTHER

## 2024-03-26 DIAGNOSIS — Z13.39 ADHD (ATTENTION DEFICIT HYPERACTIVITY DISORDER) EVALUATION: ICD-10-CM

## 2024-03-26 NOTE — TELEPHONE ENCOUNTER
Pt med was called to [facility] but they did not have it in stock so she is asking for [facility]

## 2024-03-27 RX ORDER — METHYLPHENIDATE HYDROCHLORIDE 18 MG/1
18 TABLET ORAL DAILY PRN
Qty: 30 TABLET | Refills: 0 | Status: SHIPPED | OUTPATIENT
Start: 2024-03-27

## 2024-05-01 DIAGNOSIS — Z13.39 ADHD (ATTENTION DEFICIT HYPERACTIVITY DISORDER) EVALUATION: ICD-10-CM

## 2024-05-01 RX ORDER — METHYLPHENIDATE HYDROCHLORIDE 18 MG/1
18 TABLET ORAL DAILY PRN
Qty: 30 TABLET | Refills: 0 | Status: SHIPPED | OUTPATIENT
Start: 2024-05-01

## 2024-05-14 ENCOUNTER — TELEMEDICINE (OUTPATIENT)
Dept: PSYCHIATRY | Facility: CLINIC | Age: 24
End: 2024-05-14

## 2024-05-14 DIAGNOSIS — F90.0 ADHD (ATTENTION DEFICIT HYPERACTIVITY DISORDER), INATTENTIVE TYPE: Primary | ICD-10-CM

## 2024-05-14 RX ORDER — ALBUTEROL SULFATE 90 UG/1
2 AEROSOL, METERED RESPIRATORY (INHALATION) EVERY 6 HOURS PRN
COMMUNITY
Start: 2024-03-08

## 2024-05-20 PROBLEM — F90.0 ADHD (ATTENTION DEFICIT HYPERACTIVITY DISORDER), INATTENTIVE TYPE: Status: ACTIVE | Noted: 2023-12-12

## 2024-05-20 NOTE — PSYCH
Virtual Regular Visit    Verification of patient location:    Patient is located at Home in the following state in which I hold an active license [place]      Assessment/Plan:    Problem List Items Addressed This Visit     ADHD (attention deficit hyperactivity disorder), inattentive type - Primary       Reason for visit is   Chief Complaint   Patient presents with   • Virtual Regular Visit          Encounter provider MARVA Posey      Recent Visits  Date Type Provider Dept   05/14/24 Telemedicine MARVA Posey  [facility]   Showing recent visits within past 7 days and meeting all other requirements  Future Appointments  No visits were found meeting these conditions.  Showing future appointments within next 150 days and meeting all other requirements       The patient was identified by name and date of birth. Sanam Garner was informed that this is a telemedicine visit and that the visit is being conducted throughthe Epic Embedded platform. She agrees to proceed..  My office door was closed. The patient was notified the following individuals were present in the room MARVA [employer] student.  She acknowledged consent and understanding of privacy and security of the video platform. The patient has agreed to participate and understands they can discontinue the visit at any time.    Patient is aware this is a billable service.     HPI     Past Medical History:   Diagnosis Date   • Acid reflux    • ADHD (attention deficit hyperactivity disorder)    • Anxiety    • GERD (gastroesophageal reflux disease)        Past Surgical History:   Procedure Laterality Date   • AUGMENTATION BREAST Right 8/15/2022    Procedure: AUGMENTATION BREAST;  Surgeon: Costa Case MD;  Location:  MAIN OR;  Service: Plastics   • HI MASTOPEXY Bilateral 8/15/2022    Procedure: MASTOPEXY;  Surgeon: Costa Case MD;  Location:  MAIN OR;  Service: Plastics   • WISDOM TOOTH EXTRACTION         Current  Outpatient Medications   Medication Sig Dispense Refill   • albuterol (PROVENTIL HFA,VENTOLIN HFA) 90 mcg/act inhaler Inhale 2 puffs every 6 (six) hours as needed     • drospirenone-ethinyl estradiol (SANJUANITA) 3-0.02 MG per tablet Take 1 tablet by mouth daily     • methylphenidate (CONCERTA) 18 mg ER tablet Take 1 tablet (18 mg total) by mouth daily as needed (for ADHD symptoms) Max Daily Amount: 18 mg 30 tablet 0   • pantoprazole (PROTONIX) 40 mg tablet Take 40 mg by mouth daily as needed       No current facility-administered medications for this visit.        No Known Allergies    Review of Systems    Video Exam    There were no vitals filed for this visit.    Physical Exam     Visit Time    Visit Start Time: 1545  Visit Stop Time: 1600  Total Visit Duration:  15 minutes    MEDICATION MANAGEMENT NOTE        [facility]    Name and Date of Birth:  Sanam Garner 23 y.o. 2000 MRN: [id number]    Date of Visit: May 14, 2024    SUBJECTIVE:    Sanam is seen today for a follow up for anxiety and ADHD. She continues to do very well since the last visit. Sanam seen virtually today for medication management follow up. She is calm and appropriate in conversation. Friendly and smiling, euthymic throughout conversation. 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 She reports good sleep, good appetite. She denies depression, denies any anxiety. Denies any signs of psychosis/sully.  She denies SI/HI. Denies auditory or visual hallucinations. She will follow up in office for the next appointment in 2 months. Concerta will continue at 18mg PO Daily.    She denies any side effects from current psychiatric medications.    PLAN:  Continue concerta 18mg PO daily  Follow up in office in 2 months.  She will call sooner with concerns or issues if they arise prior to scheduled  appt    Stimulants PARQ completed including elevated heart rate, elevated bp, seizures, anxiety/irritability, activation/induction of sully, abuse potential, interactions with other medications, risk of sudden death, appetite suppression/weight loss discussed.    Aware of 24 hour and weekend coverage for urgent situations accessed by calling [facility] main practice number  Medication management every 2 months  Aware of need to follow up with family physician for medical issues    Diagnoses and all orders for this visit:    ADHD (attention deficit hyperactivity disorder), inattentive type    Other orders  -     albuterol (PROVENTIL HFA,VENTOLIN HFA) 90 mcg/act inhaler; Inhale 2 puffs every 6 (six) hours as needed      Psychotherapy Provided:     Individual psychotherapy provided: Yes  Supportive counseling provided.  Medication education provided to Sanam.  Coping strategies reviewed with Sanam.   Importance of medication and treatment compliance reviewed with Sanam.  Importance of follow up with family physician for medical issues reviewed with Sanam.  Reassurance and supportive therapy provided.   Crisis/safety plan discussed with Sanam. Patient will call prior to scheduled appointment if they have any issues or concerns.  Patient understands they can access the office by calling the main number at any time if they are in crisis.  They also understand they can call their [facility]'s crisis number or go to their nearest ED if suicidal ideation increases or if they develop a plan or intent.     HPI ROS Appetite Changes and Sleep:     She reports normal sleep, normal appetite, normal energy level. Denies homicidal ideation, denies suicidal ideation    Review Of Systems:      HPI ROS:               Medication Side Effects:  denies   Depression (10 worst): denies   Anxiety (10 worst): denies   Safety concerns (SI, HI, etc): denies   Sleep: good   Energy: good   Appetite: good     General normal     Personality no change in personality   Constitutional negative   ENT negative   Cardiovascular negative   Respiratory negative   Gastrointestinal negative   Genitourinary negative   Musculoskeletal negative   Integumentary negative   Neurological negative   Endocrine negative   Other Symptoms none, all other systems are negative     Mental Status Evaluation:    Appearance Adequate hygiene and grooming   Behavior calm and cooperative   Mood euthymic  Depression Scale - 0 of 10 (0 = No depression)  Anxiety Scale - 0 of 10 (0 = No anxiety)   Speech Normal rate and volume   Affect appropriate and mood-congruent   Thought Processes Goal directed and coherent   Thought Content Does not verbalize delusional material   Associations Tightly connected   Perceptual Disturbances Denies hallucinations and does not appear to be responding to internal stimuli   Risk Potential Suicidal/Homicidal Ideation - No evidence of suicidal or homicidal ideation and patient does not verbalize suicidal or homicidal ideation  Risk of Violence - No evidence of risk for violence found on assessment  Risk of Self Mutilation - No evidence of risk for self mutilation found on assessment   Orientation oriented to person, place, time/date, and situation   Memory recent and remote memory grossly intact   Consciousness alert and awake   Attention/Concentration attention span and concentration are age appropriate   Insight intact   Judgement intact   Muscle Strength and Gait normal muscle strength and normal muscle tone, normal gait/station and normal balance   Motor Activity no abnormal movements   Language no difficulty naming common objects, no difficulty repeating a phrase, no difficulty writing a sentence   Fund of Knowledge adequate knowledge of current events  adequate fund of knowledge regarding past history  adequate fund of knowledge regarding vocabulary      Past Psychiatric History Update:     Inpatient Psychiatric Admission Since Last  Encounter:   no  Changes to Outpatient Psychiatric Treatment Team:    no  Suicide Attempt Or Self Mutilation Since Last Encounter:   no  Incidence of Violent Behavior Since Last Encounter:   no    Traumatic History Update:     New Onset of Abuse Since Last Encounter:   no  Traumatic Events Since Last Encounter:   no    Substance Abuse History:    Social History     Substance and Sexual Activity   Alcohol Use Yes   • Alcohol/week: 2.0 standard drinks of alcohol   • Types: 2 Standard drinks or equivalent per week    Comment: social 2 drinks every other week     Social History     Substance and Sexual Activity   Drug Use Not Currently     Social History:    Social History     Socioeconomic History   • Marital status: Single     Spouse name: Not on file   • Number of children: Not on file   • Years of education: some college   • Highest education level: Some college, no degree   Occupational History     Employer: [employer]   Tobacco Use   • Smoking status: Never   • Smokeless tobacco: Never   Vaping Use   • Vaping status: Former   Substance and Sexual Activity   • Alcohol use: Yes     Alcohol/week: 2.0 standard drinks of alcohol     Types: 2 Standard drinks or equivalent per week     Comment: social 2 drinks every other week   • Drug use: Not Currently   • Sexual activity: Not Currently   Other Topics Concern   • Not on file   Social History Narrative   • Not on file     Social Determinants of Health     Financial Resource Strain: Not on file   Food Insecurity: Not on file   Transportation Needs: Not on file   Physical Activity: Not on file   Stress: Not on file   Social Connections: Not on file   Intimate Partner Violence: Not on file   Housing Stability: Not on file     Family Psychiatric History:     Family History   Problem Relation Age of Onset   • Anxiety disorder Mother    • ADD / ADHD Father    • Anxiety disorder Sister      History Review:The following portions of the patient's history were  reviewed and updated as appropriate: allergies, current medications, past family history, past medical history, past social history, past surgical history, and problem list     OBJECTIVE:     Vital signs in last 24 hours:    There were no vitals filed for this visit.  Laboratory Results: I have personally reviewed all pertinent laboratory/tests results.    Suicide/Homicide Risk Assessment:    Risk of Harm to Self:  The following ratings are based on assessment at the time of the interview  Recent Specific Risk Factors include: none  Demographic risk factors include: , age: young adult (15-24)  Historical Risk Factors include: history of anxiety  Protective Factors: no current suicidal ideation, access to mental health treatment, compliant with medications, compliant with mental health treatment, good health, good self-esteem, having a desire to be alive, stable living environment, stable job, sense of determination, strong relationships, supportive family, supportive friends  Weapons: none. The following steps have been taken to ensure weapons are properly secured: not applicable  Based on today's assessment, aSnam presents the following risk of harm to self: none    Risk of Harm to Others:  The following ratings are based on assessment at the time of the interview  Protective Factors: no current homicidal ideation  Based on today's assessment, Sanam presents the following risk of harm to others: none    The following interventions are recommended: return in 2 months for reassessment    Medications Risks/Benefits:      Risks, Benefits And Possible Side Effects Of Medications:    Discussed risks and benefits of treatment with patient including risks of cardiovascular side effects including elevated blood pressure, risk of misuse, abuse or dependence and risk of increased anxiety related to treatment with stimulant medications     Controlled Medication Discussion:     Sanam has been filling controlled  prescriptions on time as prescribed according to [place] Prescription Drug Monitoring Program    Treatment Plan:    Due for update/Updated:   no  Last treatment plan done 2/16/24 by MARVA Ruiz.  Treatment Plan due on 8/16/24.    MARVA Posey 05/20/24    This note was shared with patient.

## 2024-06-06 DIAGNOSIS — Z00.6 ENCOUNTER FOR EXAMINATION FOR NORMAL COMPARISON OR CONTROL IN CLINICAL RESEARCH PROGRAM: ICD-10-CM

## 2024-06-10 DIAGNOSIS — Z13.39 ADHD (ATTENTION DEFICIT HYPERACTIVITY DISORDER) EVALUATION: ICD-10-CM

## 2024-06-10 RX ORDER — METHYLPHENIDATE HYDROCHLORIDE 18 MG/1
18 TABLET ORAL DAILY PRN
Qty: 30 TABLET | Refills: 0 | Status: SHIPPED | OUTPATIENT
Start: 2024-06-10 | End: 2024-06-10 | Stop reason: SDUPTHER

## 2024-06-11 RX ORDER — METHYLPHENIDATE HYDROCHLORIDE 18 MG/1
18 TABLET ORAL DAILY PRN
Qty: 30 TABLET | Refills: 0 | Status: SHIPPED | OUTPATIENT
Start: 2024-06-11

## 2024-06-11 NOTE — PROGRESS NOTES
Sanam called requesting her concerta be sent to a different pharmacy.  Called [facility] and let them know that she will not be picking the medication up at their pharmacy and to cancel the prescription.

## 2024-06-26 ENCOUNTER — OFFICE VISIT (OUTPATIENT)
Dept: PSYCHIATRY | Facility: CLINIC | Age: 24
End: 2024-06-26
Payer: COMMERCIAL

## 2024-06-26 VITALS — SYSTOLIC BLOOD PRESSURE: 124 MMHG | DIASTOLIC BLOOD PRESSURE: 85 MMHG | HEART RATE: 91 BPM

## 2024-06-26 DIAGNOSIS — F90.0 ADHD (ATTENTION DEFICIT HYPERACTIVITY DISORDER), INATTENTIVE TYPE: Primary | ICD-10-CM

## 2024-06-26 PROCEDURE — 99213 OFFICE O/P EST LOW 20 MIN: CPT | Performed by: NURSE PRACTITIONER

## 2024-06-26 RX ORDER — TIRZEPATIDE 2.5 MG/.5ML
INJECTION, SOLUTION SUBCUTANEOUS
COMMUNITY
Start: 2024-06-12

## 2024-06-26 NOTE — PSYCH
Regular Visit    Problem List Items Addressed This Visit     ADHD (attention deficit hyperactivity disorder), inattentive type - Primary    Relevant Medications    Zepbound 2.5 MG/0.5ML auto-injector            Encounter provider MARVA Posey    Provider located at    [facility]  [address] [facility] [address] [phone]    Recent Visits  No visits were found meeting these conditions.  Showing recent visits within past 7 days and meeting all other requirements  Today's Visits  Date Type Provider Dept   06/26/24 Office Visit MARVA Posey [facility]   Showing today's visits and meeting all other requirements  Future Appointments  No visits were found meeting these conditions.  Showing future appointments within next 150 days and meeting all other requirements       HPI     Current Outpatient Medications   Medication Sig Dispense Refill   • albuterol (PROVENTIL HFA,VENTOLIN HFA) 90 mcg/act inhaler Inhale 2 puffs every 6 (six) hours as needed     • drospirenone-ethinyl estradiol (SANJUANITA) 3-0.02 MG per tablet Take 1 tablet by mouth daily     • methylphenidate (CONCERTA) 18 mg ER tablet Take 1 tablet (18 mg total) by mouth daily as needed (for ADHD symptoms) Max Daily Amount: 18 mg 30 tablet 0   • pantoprazole (PROTONIX) 40 mg tablet Take 40 mg by mouth daily as needed     • Zepbound 2.5 MG/0.5ML auto-injector        No current facility-administered medications for this visit.       Review of Systems    I spent 10 minutes directly with the patient during this visit      MEDICATION MANAGEMENT NOTE        [facility]    Name and Date of Birth:  Sanam Garner 23 y.o. 2000 MRN: [id number]    Date of Visit: June 26, 2024    No Known Allergies    Visit Time    Visit Start Time: 1320  Visit Stop Time: 1330  Total Visit Duration:  10 minutes    SUBJECTIVE:    Sanam is seen  today for a follow up for ADHD. She continues to do very well since the last visit. Sanam seen in the office today for medication management follow up. She was last seen by this provider on 5/14/24. She states that she is doing very well. She has good concentration and focus. She is sleeping and eating well. She denies any depression, states she has occasional anxiety which is manageable. She denies SI/HI. Denies auditory and visual hallucinations. She is calm, appropriate, and friendly in conversation. Will continue concerta as ordered, follow up in 3 months.    She denies any side effects from current psychiatric medications.    PLAN:  Concerta 18mg PO Daily   Follow up in 3 months  The patient will call this provider sooner if concerns or issues arise prior to scheduled appointment.    Aware of 24 hour and weekend coverage for urgent situations accessed by calling [facility] main practice number  Medication management every 3 months  Aware of need to follow up with family physician for medical issues    Diagnoses and all orders for this visit:    ADHD (attention deficit hyperactivity disorder), inattentive type    Other orders  -     Zepbound 2.5 MG/0.5ML auto-injector      Current Outpatient Medications on File Prior to Visit   Medication Sig Dispense Refill   • albuterol (PROVENTIL HFA,VENTOLIN HFA) 90 mcg/act inhaler Inhale 2 puffs every 6 (six) hours as needed     • drospirenone-ethinyl estradiol (SANJUANITA) 3-0.02 MG per tablet Take 1 tablet by mouth daily     • methylphenidate (CONCERTA) 18 mg ER tablet Take 1 tablet (18 mg total) by mouth daily as needed (for ADHD symptoms) Max Daily Amount: 18 mg 30 tablet 0   • pantoprazole (PROTONIX) 40 mg tablet Take 40 mg by mouth daily as needed     • Zepbound 2.5 MG/0.5ML auto-injector        No current facility-administered medications on file prior to visit.       Psychotherapy Provided:     Individual psychotherapy provided: Yes  Supportive  counseling provided.  Medication education provided to Sanam.  Recent stressor including occasional anxiety discussed with Sanam.   Coping strategies reviewed with Sanam.   Importance of medication and treatment compliance reviewed with Sanam.  Importance of follow up with family physician for medical issues reviewed with Sanam.  Reassurance and supportive therapy provided.   Crisis/safety plan discussed with Sanam. Patient will call prior to scheduled appointment if they have any issues or concerns.  Patient understands they can access the office by calling the main number at any time if they are in crisis.  They also understand they can call their [facility]'s crisis number or go to their nearest ED if suicidal ideation increases or if they develop a plan or intent.       HPI ROS Appetite Changes and Sleep:     She reports normal sleep, normal appetite, normal energy level. Denies homicidal ideation, denies suicidal ideation    Review Of Systems:      HPI ROS:               Medication Side Effects:  denies   Depression (10 worst): denies   Anxiety (10 worst): occasional   Safety concerns (SI, HI, etc): denies   Sleep: good   Energy: good   Appetite: good     General normal    Personality no change in personality   Constitutional negative   ENT negative   Cardiovascular negative   Respiratory negative   Gastrointestinal negative   Genitourinary negative   Musculoskeletal negative   Integumentary negative   Neurological negative   Endocrine negative   Other Symptoms none, all other systems are negative     Mental Status Evaluation:    Appearance Appropriately dressed and Good eye contact   Behavior calm and cooperative   Mood euthymic  Depression Scale - 0 of 10 (0 = No depression)  Anxiety Scale -  occasional  of 10 (0 = No anxiety)   Speech Normal rate and volume   Affect appropriate and mood-congruent   Thought Processes Goal directed and coherent   Thought Content Does not verbalize delusional material   Associations  Tightly connected   Perceptual Disturbances Denies hallucinations and does not appear to be responding to internal stimuli   Risk Potential Suicidal/Homicidal Ideation - No evidence of suicidal or homicidal ideation and patient does not verbalize suicidal or homicidal ideation  Risk of Violence - No evidence of risk for violence found on assessment  Risk of Self Mutilation - No evidence of risk for self mutilation found on assessment   Orientation oriented to person, place, time/date, and situation   Memory recent and remote memory grossly intact   Consciousness alert and awake   Attention/Concentration attention span and concentration are age appropriate   Insight intact   Judgement intact   Muscle Strength and Gait normal muscle strength and normal muscle tone, normal gait/station and normal balance   Motor Activity no abnormal movements   Language no difficulty naming common objects, no difficulty repeating a phrase, no difficulty writing a sentence   Fund of Knowledge adequate knowledge of current events  adequate fund of knowledge regarding past history  adequate fund of knowledge regarding vocabulary      Past Psychiatric History Update:     Inpatient Psychiatric Admission Since Last Encounter:   no  Changes to Outpatient Psychiatric Treatment Team:    no  Suicide Attempt Or Self Mutilation Since Last Encounter:   no  Incidence of Violent Behavior Since Last Encounter:   no    Traumatic History Update:     New Onset of Abuse Since Last Encounter:   no  Traumatic Events Since Last Encounter:   no    Past Medical History:    Past Medical History:   Diagnosis Date   • Acid reflux    • ADHD (attention deficit hyperactivity disorder)    • Anxiety    • GERD (gastroesophageal reflux disease)         Past Surgical History:   Procedure Laterality Date   • AUGMENTATION BREAST Right 8/15/2022    Procedure: AUGMENTATION BREAST;  Surgeon: Costa Case MD;  Location:  MAIN OR;  Service: Plastics   • WI MASTOPEXY  Bilateral 8/15/2022    Procedure: MASTOPEXY;  Surgeon: Costa Case MD;  Location:  MAIN OR;  Service: Plastics   • WISDOM TOOTH EXTRACTION       No Known Allergies  Substance Abuse History:    Social History     Substance and Sexual Activity   Alcohol Use Yes   • Alcohol/week: 2.0 standard drinks of alcohol   • Types: 2 Standard drinks or equivalent per week    Comment: social 2 drinks every other week     Social History     Substance and Sexual Activity   Drug Use Not Currently     Social History:    Social History     Socioeconomic History   • Marital status: Single     Spouse name: Not on file   • Number of children: Not on file   • Years of education: some college   • Highest education level: Some college, no degree   Occupational History     Employer: [employer]   Tobacco Use   • Smoking status: Never   • Smokeless tobacco: Never   Vaping Use   • Vaping status: Former   Substance and Sexual Activity   • Alcohol use: Yes     Alcohol/week: 2.0 standard drinks of alcohol     Types: 2 Standard drinks or equivalent per week     Comment: social 2 drinks every other week   • Drug use: Not Currently   • Sexual activity: Not Currently   Other Topics Concern   • Not on file   Social History Narrative   • Not on file     Social Determinants of Health     Financial Resource Strain: Not on file   Food Insecurity: Not on file   Transportation Needs: Not on file   Physical Activity: Not on file   Stress: Not on file   Social Connections: Not on file   Intimate Partner Violence: Not on file   Housing Stability: Not on file     Family Psychiatric History:     Family History   Problem Relation Age of Onset   • Anxiety disorder Mother    • ADD / ADHD Father    • Anxiety disorder Sister      History Review:The following portions of the patient's history were reviewed and updated as appropriate: allergies, current medications, past family history, past medical history, past social history, past surgical history,  and problem list     OBJECTIVE:     Vital signs in last 24 hours:    Vitals:    06/26/24 1354   BP: 124/85   Pulse: 91     Laboratory Results: I have personally reviewed all pertinent laboratory/tests results.    Suicide/Homicide Risk Assessment:    Risk of Harm to Self:  The following ratings are based on assessment at the time of the interview  Recent Specific Risk Factors include: none  Demographic risk factors include: , age: young adult (15-24)  Historical Risk Factors include: chronic anxiety symptoms  Protective Factors: no current suicidal ideation, access to mental health treatment, compliant with medications, compliant with mental health treatment, effective decision-making skills, good self-esteem, having a desire to live, having a sense of purpose or meaning in life, stable living environment, stable job, sense of determination, supportive family  Weapons: gun. The following steps have been taken to ensure weapons are properly secured: locked, secured  Based on today's assessment, Sanam presents the following risk of harm to self: none    Risk of Harm to Others:  The following ratings are based on assessment at the time of the interview  Protective Factors: no current homicidal ideation  Based on today's assessment, Sanam presents the following risk of harm to others: none    The following interventions are recommended: contracts for safety at present - agrees to go to ED if feeling unsafe, return in 3 months for reassessment, contracts for safety at present - agrees to call Crisis Intervention Service if feeling unsafe    Medications Risks/Benefits:      Risks, Benefits And Possible Side Effects Of Medications:    Discussed risks and benefits of treatment with patient including risks of cardiovascular side effects including elevated blood pressure, risk of misuse, abuse or dependence and risk of increased anxiety related to treatment with stimulant medications     Controlled Medication  Discussion:     Sanam has been filling controlled prescriptions on time as prescribed according to [place] Prescription Drug Monitoring Program    Treatment Plan:    Due for update/Updated:   no  Last treatment plan done 2/16/24 by MARVA Ruiz.  Treatment Plan due on 8/16/24.    MARVA Posey 06/26/24    This note was shared with patient.

## 2024-06-26 NOTE — BH CRISIS PLAN
"Client Name: Sanam Garner       Client YOB: 2000    Rubio Safety Plan    Creation Date: 6/26/24 Update Date: 6/26/24   Created By: SHELLY Posey Last Updated By: SHELLY Posey      Step 1: Warning Signs:   Warning Signs   anxiety   clench teeth, causes jaw pain            Step 2: Internal Coping Strategies:   Internal Coping Strategies   walk            Step 3: People and social settings that provide distraction:   Name Contact Information   mom, dad, sister in phone          Step 4: People whom I can ask for help during a crisis:    Name Contact Information    mom, sister, dad at home, in phone      Step 5: Professionals or agencies I can contact during a crisis:    Clinican/Agency Name Phone Emergency Contact    [facility]  shelly overton      Crisis Phone Numbers:   Suicide Prevention Lifeline: Call or Text  980 Crisis Text Line: Text HOME to 680-250   Please note: Some [facility] do not have a separate number for Child/Adolescent specific crisis. If your county is not listed under Child/Adolescent, please call the adult number for your county      Adult Crisis Numbers: Child/Adolescent Crisis Numbers   [facility]: [phone] [facility]: [phone]   [facility]: [phone] [facility]: [phone]   [facility]: [phone] [place]: [phone]   [facility]: [phone] [facility]: [phone]   [facility]: [phone]   [facility]: [phone]   [facility]: [phone]   [facility]: [phone] (daytime) [phone] (after hours, weekends, holidays)      Step 6: Making the environment safer (plan for lethal means safety):   Plan: Guns owned by dad, locked up in safe      Optional: What is most important to me and worth living for?   \"My family\"     Rubio Safety Plan. Devi Martinez and Chuy Alvarado. Used with permission of the " authors.

## 2024-07-25 ENCOUNTER — OFFICE VISIT (OUTPATIENT)
Age: 24
End: 2024-07-25
Payer: COMMERCIAL

## 2024-07-25 ENCOUNTER — TELEPHONE (OUTPATIENT)
Dept: ADMINISTRATIVE | Facility: OTHER | Age: 24
End: 2024-07-25

## 2024-07-25 VITALS
TEMPERATURE: 97.9 F | BODY MASS INDEX: 26.46 KG/M2 | DIASTOLIC BLOOD PRESSURE: 70 MMHG | OXYGEN SATURATION: 98 % | HEART RATE: 92 BPM | SYSTOLIC BLOOD PRESSURE: 116 MMHG | HEIGHT: 68 IN | WEIGHT: 174.6 LBS

## 2024-07-25 DIAGNOSIS — K21.9 GASTROESOPHAGEAL REFLUX DISEASE WITHOUT ESOPHAGITIS: Primary | ICD-10-CM

## 2024-07-25 DIAGNOSIS — F90.0 ADHD (ATTENTION DEFICIT HYPERACTIVITY DISORDER), INATTENTIVE TYPE: ICD-10-CM

## 2024-07-25 DIAGNOSIS — R73.01 ELEVATED FASTING GLUCOSE: ICD-10-CM

## 2024-07-25 DIAGNOSIS — E78.5 HYPERLIPIDEMIA, UNSPECIFIED HYPERLIPIDEMIA TYPE: ICD-10-CM

## 2024-07-25 DIAGNOSIS — S03.00XD DISLOCATION OF TEMPOROMANDIBULAR JOINT, SUBSEQUENT ENCOUNTER: ICD-10-CM

## 2024-07-25 DIAGNOSIS — Z76.89 ENCOUNTER TO ESTABLISH CARE: ICD-10-CM

## 2024-07-25 DIAGNOSIS — Z13.228 SCREENING FOR METABOLIC DISORDER: ICD-10-CM

## 2024-07-25 PROBLEM — S03.00XA TMJ (DISLOCATION OF TEMPOROMANDIBULAR JOINT): Status: ACTIVE | Noted: 2024-07-25

## 2024-07-25 PROCEDURE — 99203 OFFICE O/P NEW LOW 30 MIN: CPT | Performed by: NURSE PRACTITIONER

## 2024-07-25 RX ORDER — ONDANSETRON 4 MG/1
4 TABLET, FILM COATED ORAL AS NEEDED
COMMUNITY

## 2024-07-25 NOTE — ASSESSMENT & PLAN NOTE
-due for fasting blood work   -Continue with well-balanced diet, encouraged daily exercise  -She is up-to-date with cervical cancer screening  -She is up-to-date with vaccinations  -Encourage monthly self breast examination  -Follow-up in 1 year for annual physical or sooner if needed

## 2024-07-25 NOTE — ASSESSMENT & PLAN NOTE
-Continue to follow with dentist  -Recommend massage  -Recommend decrease stress  -Suggested anti-inflammatory muscle relaxer, patient has tried these in the past without relief

## 2024-07-25 NOTE — TELEPHONE ENCOUNTER
----- Message from Jazzy MENDEZ sent at 7/25/2024 10:57 AM EDT -----  07/25/24 10:57 AM    Hello, our patient Sanam Garner has had Pap Smear (HPV) aka Cervical Cancer Screening completed/performed. Please assist in updating the patient chart by making an External outreach to [facility]  facility located in [address] [place] [address] [address] [place] [address]. The date of service is 2024.    Thank you,  Jazzy Quinn [facility] ANGYUppergladeMAGI

## 2024-07-25 NOTE — ASSESSMENT & PLAN NOTE
-referral to GI   -continue PPI    You may use OTC tums as needed.  Recommend small frequent meals throughout the day.  Avoid aggravating foods - spicy foods, acidic foods - such as tomato and citrus.  Avoid alcohol.  Do not lay down for at least 30 mins after eating.

## 2024-07-25 NOTE — TELEPHONE ENCOUNTER
Upon review of the In Basket request we were able to locate, review, and update the patient chart as requested for Chlamydia, Hepatitis C , HIV, and Pap Smear (HPV) aka Cervical Cancer Screening.    Any additional questions or concerns should be emailed to the Practice Liaisons via the appropriate education email address, please do not reply via In Basket.    Thank you  Savana Campo   [facility]

## 2024-07-25 NOTE — PROGRESS NOTES
Assessment/Plan:    ADHD (attention deficit hyperactivity disorder), inattentive type  -Currently follows psychiatry  -Controlled on Concerta      Gastroesophageal reflux disease without esophagitis  -referral to GI   -continue PPI    You may use OTC tums as needed.  Recommend small frequent meals throughout the day.  Avoid aggravating foods - spicy foods, acidic foods - such as tomato and citrus.  Avoid alcohol.  Do not lay down for at least 30 mins after eating.         Encounter to establish care  -due for fasting blood work   -Continue with well-balanced diet, encouraged daily exercise  -She is up-to-date with cervical cancer screening  -She is up-to-date with vaccinations  -Encourage monthly self breast examination  -Follow-up in 1 year for annual physical or sooner if needed     TMJ (dislocation of temporomandibular joint)  -Continue to follow with dentist  -Recommend massage  -Recommend decrease stress  -Suggested anti-inflammatory muscle relaxer, patient has tried these in the past without relief              Diagnoses and all orders for this visit:    Gastroesophageal reflux disease without esophagitis  -     Celiac Disease Panel; Future  -     Ambulatory Referral to Gastroenterology; Future    Screening for metabolic disorder  -     Comprehensive metabolic panel; Future  -     CBC and differential  -     Lipid panel    Hyperlipidemia, unspecified hyperlipidemia type  -     Lipid panel    Elevated fasting glucose  -     Hemoglobin A1C    ADHD (attention deficit hyperactivity disorder), inattentive type    Encounter to establish care    Dislocation of temporomandibular joint, subsequent encounter    Other orders  -     ondansetron (ZOFRAN) 4 mg tablet; Take 4 mg by mouth if needed for nausea or vomiting          Subjective:      Patient ID: Sanam Garner is a 24 y.o. female.    Patient presents today to establish care with our practice.    TMJ- she reported Feb 2024, has been better the past few weeks,  has seen her dentist, wears a mouth guard at night, has been going on the past few years   Takes Advil and muscle relaxer in the past with with no relief   Also was on steroids with no relief     Heartburn- started in 2019, she reports that when she cuts out gluten heartburn went away, takes Prilosec as needed (discussed rebound gastric reflux)    BMI 26.55- follows online company that prescribes Zepbound, starting weight  190lbs, started about 5 weeks ago     ADHD-currently managed by [facility] psychiatry, controlled on Concerta  HM: Cervical cancer screening - pt had one at [facility] in January 2024. Will send care gap message.  Depression screening     Cervical CA screening- 2024 follows [facility]     She report Tdap is up to date         The following portions of the patient's history were reviewed and updated as appropriate: allergies, current medications, past family history, past medical history, past social history, past surgical history, and problem list.    Review of Systems   Constitutional:  Negative for activity change, appetite change, chills, diaphoresis and fever.   HENT:  Negative for congestion, ear discharge, ear pain, postnasal drip, rhinorrhea, sinus pressure, sinus pain and sore throat.    Eyes:  Negative for pain, discharge, itching and visual disturbance.   Respiratory:  Negative for cough, chest tightness, shortness of breath and wheezing.    Cardiovascular:  Negative for chest pain, palpitations and leg swelling.   Gastrointestinal:  Negative for abdominal pain, constipation, diarrhea, nausea and vomiting.   Endocrine: Negative for polydipsia, polyphagia and polyuria.   Genitourinary:  Negative for difficulty urinating, dysuria and urgency.   Musculoskeletal:  Negative for arthralgias, back pain and neck pain.   Skin:  Negative for rash and wound.   Neurological:  Negative for dizziness, weakness, numbness and headaches.         Past Medical History:   Diagnosis Date    Acid reflux     ADHD  "(attention deficit hyperactivity disorder)     Anxiety     GERD (gastroesophageal reflux disease)          Current Outpatient Medications:     drospirenone-ethinyl estradiol (SANJUANITA) 3-0.02 MG per tablet, Take 1 tablet by mouth daily, Disp: , Rfl:     ondansetron (ZOFRAN) 4 mg tablet, Take 4 mg by mouth if needed for nausea or vomiting, Disp: , Rfl:     Zepbound 2.5 MG/0.5ML auto-injector, , Disp: , Rfl:     methylphenidate (CONCERTA) 18 mg ER tablet, Take 1 tablet (18 mg total) by mouth daily as needed (for ADHD symptoms) Max Daily Amount: 18 mg, Disp: 30 tablet, Rfl: 0    No Known Allergies    Social History   Past Surgical History:   Procedure Laterality Date    AUGMENTATION BREAST Right 8/15/2022    Procedure: AUGMENTATION BREAST;  Surgeon: Costa Case MD;  Location:  MAIN OR;  Service: Plastics    MO MASTOPEXY Bilateral 8/15/2022    Procedure: MASTOPEXY;  Surgeon: Costa Case MD;  Location:  MAIN OR;  Service: Plastics    WISDOM TOOTH EXTRACTION       Family History   Problem Relation Age of Onset    Anxiety disorder Mother     ADD / ADHD Father     Anxiety disorder Sister        Objective:  /70 (BP Location: Left arm, Patient Position: Sitting, Cuff Size: Standard)   Pulse 92   Temp 97.9 °F (36.6 °C) (Temporal)   Ht 5' 8\" (1.727 m)   Wt 79.2 kg (174 lb 9.6 oz)   SpO2 98%   BMI 26.55 kg/m²              Physical Exam  Constitutional:       General: She is not in acute distress.     Appearance: She is well-developed. She is not diaphoretic.   HENT:      Head: Normocephalic and atraumatic.      Right Ear: External ear normal.      Left Ear: External ear normal.      Nose: Nose normal.      Mouth/Throat:      Mouth: Mucous membranes are moist.      Pharynx: No oropharyngeal exudate or posterior oropharyngeal erythema.   Eyes:      General:         Right eye: No discharge.         Left eye: No discharge.      Conjunctiva/sclera: Conjunctivae normal.      Pupils: Pupils are equal, round, and " reactive to light.   Neck:      Thyroid: No thyromegaly.   Cardiovascular:      Rate and Rhythm: Normal rate and regular rhythm.      Heart sounds: Normal heart sounds. No murmur heard.     No friction rub. No gallop.   Pulmonary:      Effort: Pulmonary effort is normal. No respiratory distress.      Breath sounds: Normal breath sounds. No stridor. No wheezing or rales.   Abdominal:      General: Bowel sounds are normal. There is no distension.      Palpations: Abdomen is soft.      Tenderness: There is no abdominal tenderness.   Musculoskeletal:      Cervical back: Normal range of motion and neck supple.   Lymphadenopathy:      Cervical: No cervical adenopathy.   Skin:     General: Skin is warm and dry.      Findings: No erythema or rash.   Neurological:      Mental Status: She is alert and oriented to person, place, and time.   Psychiatric:         Behavior: Behavior normal.         Thought Content: Thought content normal.         Judgment: Judgment normal.

## 2024-08-27 DIAGNOSIS — Z13.39 ADHD (ATTENTION DEFICIT HYPERACTIVITY DISORDER) EVALUATION: ICD-10-CM

## 2024-08-27 RX ORDER — METHYLPHENIDATE HYDROCHLORIDE 18 MG/1
18 TABLET ORAL DAILY PRN
Qty: 30 TABLET | Refills: 0 | Status: SHIPPED | OUTPATIENT
Start: 2024-08-27

## 2024-08-27 NOTE — TELEPHONE ENCOUNTER
Reason for call:   [x] Refill   [] Prior Auth  [] Other:     Office:   [] PCP/Provider -   [x] Specialty/Provider - [facility] / MARVA Posey     Medication:       Does the patient have enough for 3 days?   [x] Yes   [] No - Send as HP to POD

## 2024-09-17 ENCOUNTER — TELEMEDICINE (OUTPATIENT)
Dept: PSYCHIATRY | Facility: CLINIC | Age: 24
End: 2024-09-17
Payer: COMMERCIAL

## 2024-09-17 DIAGNOSIS — F90.0 ADHD (ATTENTION DEFICIT HYPERACTIVITY DISORDER), INATTENTIVE TYPE: Primary | ICD-10-CM

## 2024-09-17 PROBLEM — N92.1 MENORRHAGIA WITH IRREGULAR CYCLE: Status: ACTIVE | Noted: 2017-10-25

## 2024-09-17 PROBLEM — B97.89 VIRAL TONSILLITIS: Status: ACTIVE | Noted: 2024-01-21

## 2024-09-17 PROBLEM — J03.80 VIRAL TONSILLITIS: Status: ACTIVE | Noted: 2024-01-21

## 2024-09-17 PROCEDURE — 99213 OFFICE O/P EST LOW 20 MIN: CPT | Performed by: NURSE PRACTITIONER

## 2024-09-17 NOTE — BH TREATMENT PLAN
"TREATMENT PLAN (Medication Management Only)        [facility]    Name and Date of Birth:  Sanam Garner 24 y.o. 2000  Date of Treatment Plan: September 17, 2024  Diagnosis/Diagnoses:  No diagnosis found.  Strengths/Personal Resources for Self-Care: \"able to balance work/school/personal life\".  Area/Areas of need (in own words): ADHD symptoms  1. Long Term Goal: continue improvement in ADHD symptoms.  Target Date:6 months - 3/17/2025  Person/Persons responsible for completion of goal: Sanam  2.  Short Term Objective (s) - How will we reach this goal?:   A. Provider new recommended medication/dosage changes and/or continue medication(s): continue current medications as prescribed.  B. Try relaxation techniques.  C. Exercise regularly .  Target Date:6 months - 3/17/2025  Person/Persons Responsible for Completion of Goal: Sanam  Progress Towards Goals: starting treatment, improving  Treatment Modality: medication management every 6 months, medication education at every visit  Review due 180 days from date of this plan: 6 months - 3/17/2025  Expected length of service: maintenance  My Physician/PA/NP and I have developed this plan together and I agree to work on the goals and objectives. I understand the treatment goals that were developed for my treatment.      "

## 2024-09-17 NOTE — PSYCH
Virtual Regular Visit    Verification of patient location:    Patient is located at Home in the following state in which I hold an active license [place]      Assessment/Plan:    Problem List Items Addressed This Visit       ADHD (attention deficit hyperactivity disorder), inattentive type - Primary    Relevant Orders    ECG 12 lead       Reason for visit is   Chief Complaint   Patient presents with    Virtual Regular Visit          Encounter provider MARVA Posey      Recent Visits  No visits were found meeting these conditions.  Showing recent visits within past 7 days and meeting all other requirements  Today's Visits  Date Type Provider Dept   09/17/24 Telemedicine MARVA Posey  [facility]   Showing today's visits and meeting all other requirements  Future Appointments  No visits were found meeting these conditions.  Showing future appointments within next 150 days and meeting all other requirements       The patient was identified by name and date of birth. Sanam Graner was informed that this is a telemedicine visit and that the visit is being conducted throughthe Epic Embedded platform. She agrees to proceed..  My office door was closed. No one else was in the room.  She acknowledged consent and understanding of privacy and security of the video platform. The patient has agreed to participate and understands they can discontinue the visit at any time.    Patient is aware this is a billable service.         HPI     Past Medical History:   Diagnosis Date    Acid reflux     ADHD (attention deficit hyperactivity disorder)     Anxiety     GERD (gastroesophageal reflux disease)        Past Surgical History:   Procedure Laterality Date    AUGMENTATION BREAST Right 8/15/2022    Procedure: AUGMENTATION BREAST;  Surgeon: Costa Case MD;  Location:  MAIN OR;  Service: Plastics    OH MASTOPEXY Bilateral 8/15/2022    Procedure: MASTOPEXY;  Surgeon: Costa Case MD;  Location:   MAIN OR;  Service: Plastics    WISDOM TOOTH EXTRACTION         Current Outpatient Medications   Medication Sig Dispense Refill    drospirenone-ethinyl estradiol (SANJUANITA) 3-0.02 MG per tablet Take 1 tablet by mouth daily      methylphenidate (CONCERTA) 18 mg ER tablet Take 1 tablet (18 mg total) by mouth daily as needed (for ADHD symptoms) Max Daily Amount: 18 mg 30 tablet 0    Zepbound 2.5 MG/0.5ML auto-injector       ondansetron (ZOFRAN) 4 mg tablet Take 4 mg by mouth if needed for nausea or vomiting (Patient not taking: Reported on 9/17/2024)       No current facility-administered medications for this visit.        No Known Allergies    Review of Systems    Video Exam    There were no vitals filed for this visit.    Physical Exam     Visit Time    Visit Start Time: 1358  Visit Stop Time: 1408  Total Visit Duration:  10 minutes  MEDICATION MANAGEMENT NOTE        [facility]    Name and Date of Birth:  Sanam Garner 24 y.o. 2000 MRN: [id number]    Date of Visit: September 17, 2024    SUBJECTIVE:    Sanam is seen today for a follow up for ADHD. She continues to do well since the last visit.  Sanam is seen virtually today for medication management follow-up.  She was last seen by this provider on 6/26/2024.  She reports today that she is in her last 3 weeks of classes and starts her job on [email or web address].  She is excited and looking forward to this change.  States her focus and concentration is good and continues to take the Concerta to help with her focus and concentration.  She takes breaks from the Concerta when she does not need it.  She does feel that she gets more fidgety when she does take it, but does not feel that it impairs her functioning.  She states that her sleep is good, her appetite is good.  She has started exercising and running.  She states that she has lost a couple pounds and feels good about it.  She rates her anxiety at 2 out of 10 at its  worst.  She denies any depression.  She denies any suicidal or homicidal ideation, and denies any auditory or visual hallucinations.  She is calm and appropriate in conversation, mood congruent.  We will continue her medication as ordered and follow up in 6 months.  EKG was ordered for follow-up appointment.    She denies any side effects from medications.    PLAN:  Continue Concerta 18 mg p.o. daily for ADHD symptoms  EKG ordered for follow-up appointment  Follow-up in 6 months  The patient will call this provider sooner if concerns or issues arise prior to scheduled appointment.    Aware of 24 hour and weekend coverage for urgent situations accessed by calling [facility] main practice number  Medication management every 6 months  Aware of need to follow up with family physician for medical issues    Assessment & Plan  ADHD (attention deficit hyperactivity disorder), inattentive type  Stable on Concerta 18mg PO daily.    Orders:    ECG 12 lead; Future        Diagnoses and all orders for this visit:    ADHD (attention deficit hyperactivity disorder), inattentive type  -     ECG 12 lead; Future        Current Rating Scores:     Current PHQ-9   PHQ-2/9 Depression Screening    Little interest or pleasure in doing things: 0 - not at all  Feeling down, depressed, or hopeless: 0 - not at all  Trouble falling or staying asleep, or sleeping too much: 0 - not at all  Feeling tired or having little energy: 1 - several days  Poor appetite or overeatin - not at all  Feeling bad about yourself - or that you are a failure or have let yourself or your family down: 0 - not at all  Trouble concentrating on things, such as reading the newspaper or watching television: 1 - several days  Moving or speaking so slowly that other people could have noticed. Or the opposite - being so fidgety or restless that you have been moving around a lot more than usual: 0 - not at all  Thoughts that you would be better off  dead, or of hurting yourself in some way: 0 - not at all  PHQ-9 Score: 2  PHQ-9 Interpretation: No or Minimal depression       Current CARY-7 is   CARY-7 Flowsheet Screening      Flowsheet Row Most Recent Value   Over the last two weeks, how often have you been bothered by the following problems?     Feeling nervous, anxious, or on edge 0   Not being able to stop or control worrying 0   Worrying too much about different things 1   Trouble relaxing  0   Being so restless that it's hard to sit still 0   Becoming easily annoyed or irritable  0   Feeling afraid as if something awful might happen 0   How difficult have these problems made it for you to do your work, take care of things at home, or get along with other people?  Not difficult at all   CARY Score  1        .    Psychotherapy Provided:     Individual psychotherapy provided: Yes  Supportive counseling provided.  Medication education provided to Sanam.  Recent stressor including occasional anxiety discussed with Sanam.   Coping strategies reviewed with Sanam.   Importance of medication and treatment compliance reviewed with Sanam.  Importance of follow up with family physician for medical issues reviewed with Sanam.  Reassurance and supportive therapy provided.   Crisis/safety plan discussed with Sanam. Patient will call prior to scheduled appointment if they have any issues or concerns.  Patient understands they can access the office by calling the main number at any time if they are in crisis.  They also understand they can call their [facility]'s crisis number or go to their nearest ED if suicidal ideation increases or if they develop a plan or intent.       HPI ROS Appetite Changes and Sleep:     She reports normal sleep, normal appetite, normal energy level. Denies homicidal ideation, denies suicidal ideation    Review Of Systems:      HPI ROS:               Medication Side Effects:  denies   Depression (10 worst): 0/10   Anxiety (10 worst): 2/10   Safety concerns  (SI, HI, etc): denies   Sleep: good   Energy: good   Appetite: good   Weight Change: States she lost a few pounds through diet and exercise     General normal    Personality no change in personality   Constitutional as noted in HPI   ENT negative   Cardiovascular negative   Respiratory negative   Gastrointestinal negative   Genitourinary negative   Musculoskeletal negative   Integumentary negative   Neurological negative   Endocrine negative   Other Symptoms none, all other systems are negative     Mental Status Evaluation:    Appearance Appropriately dressed and Good eye contact   Behavior calm and cooperative   Mood euthymic  Depression Scale - 0 of 10 (0 = No depression)  Anxiety Scale - 2 of 10 (0 = No anxiety)   Speech Normal rate and volume   Affect appropriate and mood-congruent   Thought Processes Goal directed and coherent   Thought Content Does not verbalize delusional material   Associations Tightly connected   Perceptual Disturbances Denies hallucinations and does not appear to be responding to internal stimuli   Risk Potential Suicidal/Homicidal Ideation - No evidence of suicidal or homicidal ideation and patient does not verbalize suicidal or homicidal ideation  Risk of Violence - No evidence of risk for violence found on assessment  Risk of Self Mutilation - No evidence of risk for self mutilation found on assessment   Orientation oriented to person, place, time/date, and situation   Memory recent and remote memory grossly intact   Consciousness alert and awake   Attention/Concentration attention span and concentration are age appropriate   Insight intact   Judgement intact   Muscle Strength and Gait normal muscle strength and normal muscle tone, normal gait/station and normal balance   Motor Activity unable to assess today due to virtual visit   Language no difficulty naming common objects, no difficulty repeating a phrase, no difficulty writing a sentence   Fund of Knowledge adequate knowledge of  current events  adequate fund of knowledge regarding past history  adequate fund of knowledge regarding vocabulary      Past Psychiatric History Update:     Inpatient Psychiatric Admission Since Last Encounter:   no  Changes to Outpatient Psychiatric Treatment Team:    no  Suicide Attempt Or Self Mutilation Since Last Encounter:   no  Incidence of Violent Behavior Since Last Encounter:   no    Traumatic History Update:     New Onset of Abuse Since Last Encounter:   no  Traumatic Events Since Last Encounter:   no    Substance Abuse History:    Social History     Substance and Sexual Activity   Alcohol Use Yes    Alcohol/week: 2.0 standard drinks of alcohol    Types: 2 Standard drinks or equivalent per week    Comment: once a week or less     Social History     Substance and Sexual Activity   Drug Use Not Currently     Social History:    Social History     Socioeconomic History    Marital status: Single     Spouse name: Not on file    Number of children: Not on file    Years of education: some college    Highest education level: Some college, no degree   Occupational History     Employer: [employer]   Tobacco Use    Smoking status: Never    Smokeless tobacco: Never   Vaping Use    Vaping status: Former   Substance and Sexual Activity    Alcohol use: Yes     Alcohol/week: 2.0 standard drinks of alcohol     Types: 2 Standard drinks or equivalent per week     Comment: once a week or less    Drug use: Not Currently    Sexual activity: Not Currently   Other Topics Concern    Not on file   Social History Narrative    Not on file     Social Determinants of Health     Financial Resource Strain: Not on file   Food Insecurity: Not on file   Transportation Needs: Not on file   Physical Activity: Not on file   Stress: Not on file   Social Connections: Not on file   Intimate Partner Violence: Not on file   Housing Stability: Not on file     Family Psychiatric History:     Family History   Problem Relation Age of  Onset    Anxiety disorder Mother     ADD / ADHD Father     Anxiety disorder Sister      History Review:The following portions of the patient's history were reviewed and updated as appropriate: allergies, current medications, past family history, past medical history, past social history, past surgical history, and problem list     OBJECTIVE:     Vital signs in last 24 hours:    There were no vitals filed for this visit.  Laboratory Results: I have personally reviewed all pertinent laboratory/tests results.    Suicide/Homicide Risk Assessment:    Risk of Harm to Self:  The following ratings are based on assessment at the time of the interview  Recent Specific Risk Factors include: none  Demographic risk factors include: , age: young adult (15-24)  Historical Risk Factors include: history of anxiety  Protective Factors: no current suicidal ideation, ability to adapt to change, access to mental health treatment, compliant with medications, compliant with mental health treatment, effective coping skills, effective decision-making skills, good self-esteem, having a desire to be alive, having a sense of purpose or meaning in life, medical compliance, no substance use problems, opportunities to contribute to community, stable living environment, stable job, sense of determination, strong relationships, supportive family  Weapons: none. The following steps have been taken to ensure weapons are properly secured: not applicable  Based on today's assessment, Sanam presents the following risk of harm to self: none    Risk of Harm to Others:  The following ratings are based on assessment at the time of the interview  Protective Factors: no current homicidal ideation  Based on today's assessment, Sanam presents the following risk of harm to others: none    The following interventions are recommended: contracts for safety at present - agrees to go to ED if feeling unsafe, contracts for safety at present - agrees to call  Crisis Intervention Service if feeling unsafe    Medications Risks/Benefits:      Risks, Benefits And Possible Side Effects Of Medications:    Discussed risks and benefits of treatment with patient including risks of cardiovascular side effects including elevated blood pressure, risk of misuse, abuse or dependence and risk of increased anxiety related to treatment with stimulant medications     Controlled Medication Discussion:     Sanam has been filling controlled prescriptions on time as prescribed according to [place] Prescription Drug Monitoring Program    Treatment Plan:    Due for update/Updated:   no  Last treatment plan done 9/17/24 by MARVA Ruiz.  Treatment Plan due on 3/17/25.    MARVA Posey 09/17/24    This note was shared with patient.

## 2024-09-24 ENCOUNTER — OFFICE VISIT (OUTPATIENT)
Dept: GASTROENTEROLOGY | Facility: CLINIC | Age: 24
End: 2024-09-24
Payer: COMMERCIAL

## 2024-09-24 ENCOUNTER — APPOINTMENT (OUTPATIENT)
Dept: LAB | Facility: CLINIC | Age: 24
End: 2024-09-24
Payer: COMMERCIAL

## 2024-09-24 VITALS
HEIGHT: 68 IN | SYSTOLIC BLOOD PRESSURE: 120 MMHG | BODY MASS INDEX: 25.76 KG/M2 | WEIGHT: 170 LBS | DIASTOLIC BLOOD PRESSURE: 70 MMHG | TEMPERATURE: 98.5 F

## 2024-09-24 DIAGNOSIS — R74.8 ELEVATED ALKALINE PHOSPHATASE LEVEL: ICD-10-CM

## 2024-09-24 DIAGNOSIS — R10.13 EPIGASTRIC PAIN: ICD-10-CM

## 2024-09-24 DIAGNOSIS — R74.8 ALKALINE PHOSPHATASE ELEVATION: ICD-10-CM

## 2024-09-24 DIAGNOSIS — Z00.6 ENCOUNTER FOR EXAMINATION FOR NORMAL COMPARISON OR CONTROL IN CLINICAL RESEARCH PROGRAM: ICD-10-CM

## 2024-09-24 DIAGNOSIS — Z13.228 SCREENING FOR METABOLIC DISORDER: ICD-10-CM

## 2024-09-24 DIAGNOSIS — K21.9 GASTROESOPHAGEAL REFLUX DISEASE WITHOUT ESOPHAGITIS: Primary | ICD-10-CM

## 2024-09-24 DIAGNOSIS — K21.9 GASTROESOPHAGEAL REFLUX DISEASE WITHOUT ESOPHAGITIS: ICD-10-CM

## 2024-09-24 LAB
ALBUMIN SERPL BCG-MCNC: 4.3 G/DL (ref 3.5–5)
ALP SERPL-CCNC: 37 U/L (ref 34–104)
ALT SERPL W P-5'-P-CCNC: 13 U/L (ref 7–52)
ANA SER QL IA: NEGATIVE
ANION GAP SERPL CALCULATED.3IONS-SCNC: 9 MMOL/L (ref 4–13)
AST SERPL W P-5'-P-CCNC: 15 U/L (ref 13–39)
BILIRUB SERPL-MCNC: 0.28 MG/DL (ref 0.2–1)
BUN SERPL-MCNC: 14 MG/DL (ref 5–25)
CALCIUM SERPL-MCNC: 9.3 MG/DL (ref 8.4–10.2)
CHLORIDE SERPL-SCNC: 101 MMOL/L (ref 96–108)
CO2 SERPL-SCNC: 27 MMOL/L (ref 21–32)
CREAT SERPL-MCNC: 0.72 MG/DL (ref 0.6–1.3)
GFR SERPL CREATININE-BSD FRML MDRD: 118 ML/MIN/1.73SQ M
GGT SERPL-CCNC: 6 U/L (ref 9–64)
GLIADIN PEPTIDE IGA SER-ACNC: 0.2 U/ML
GLIADIN PEPTIDE IGA SER-ACNC: NEGATIVE
GLIADIN PEPTIDE IGG SER-ACNC: <0.4 U/ML
GLIADIN PEPTIDE IGG SER-ACNC: NEGATIVE
GLUCOSE SERPL-MCNC: 84 MG/DL (ref 65–140)
IGA SERPL-MCNC: 224 MG/DL (ref 66–433)
IGA SERPL-MCNC: 224 MG/DL (ref 66–433)
IGG SERPL-MCNC: 941 MG/DL (ref 635–1741)
IGM SERPL-MCNC: 100 MG/DL (ref 45–281)
POTASSIUM SERPL-SCNC: 4.2 MMOL/L (ref 3.5–5.3)
PROT SERPL-MCNC: 6.9 G/DL (ref 6.4–8.4)
SODIUM SERPL-SCNC: 137 MMOL/L (ref 135–147)
TTG IGA SER-ACNC: <0.5 U/ML
TTG IGA SER-ACNC: NEGATIVE
TTG IGG SER-ACNC: <0.8 U/ML
TTG IGG SER-ACNC: NEGATIVE

## 2024-09-24 PROCEDURE — 82784 ASSAY IGA/IGD/IGG/IGM EACH: CPT

## 2024-09-24 PROCEDURE — 86015 ACTIN ANTIBODY EACH: CPT | Performed by: STUDENT IN AN ORGANIZED HEALTH CARE EDUCATION/TRAINING PROGRAM

## 2024-09-24 PROCEDURE — 36415 COLL VENOUS BLD VENIPUNCTURE: CPT

## 2024-09-24 PROCEDURE — 80053 COMPREHEN METABOLIC PANEL: CPT

## 2024-09-24 PROCEDURE — 86258 DGP ANTIBODY EACH IG CLASS: CPT

## 2024-09-24 PROCEDURE — 86364 TISS TRNSGLTMNASE EA IG CLAS: CPT

## 2024-09-24 PROCEDURE — 86038 ANTINUCLEAR ANTIBODIES: CPT

## 2024-09-24 PROCEDURE — 86381 MITOCHONDRIAL ANTIBODY EACH: CPT

## 2024-09-24 PROCEDURE — 82977 ASSAY OF GGT: CPT

## 2024-09-24 PROCEDURE — 99244 OFF/OP CNSLTJ NEW/EST MOD 40: CPT | Performed by: INTERNAL MEDICINE

## 2024-09-24 PROCEDURE — 82784 ASSAY IGA/IGD/IGG/IGM EACH: CPT | Performed by: STUDENT IN AN ORGANIZED HEALTH CARE EDUCATION/TRAINING PROGRAM

## 2024-09-24 NOTE — PATIENT INSTRUCTIONS
Scheduled date of EGD(as of today):10/24/2024  Physician performing EGD:JOSE  Location of EGD:[facility]  Instructions reviewed with patient by:NR  Clearances: NONE    Patient will hold Zepound for one week   Patient will call and schedule a follow up  in three months I put in recall for her   Labs were given to be done and scheduled an US for her as well

## 2024-09-24 NOTE — PROGRESS NOTES
Consultation -  [facility]  Sanam Garner 24 y.o. female MRN: [id number]  @ Encounter: [id number]    ASSESSMENT AND PLAN:      Sanam Garner is a 24 y.o. old female with PMH of ADHD seen for GERD management.     Heartburn  Abdominal pain  Elevated alkaline phosphatase  Etiology is broad but includes GERD versus medication induced (GLP-1) versus peptic ulcer disease versus hiatal hernia versus H. Pylori versus less likely celiac.  Celiac testing from 2019 normal.  Alkaline phosphatase of 179.  Did have elevation of AST and ALT in January 2024 which have resolved likely secondary to viral infection at that time.  Hepatitis testing negative.  She would like to defer initiation of PPI at this time.  Can use Pepcid as needed for symptom relief.  Will plan for EGD.  Recommend biopsies to rule out H. pylori, EOE, celiac disease.  Recheck celiac panel.  She is not avoiding gluten at this time.  For alkaline phosphatase check right upper quadrant ultrasound, CMP, GGT TASIA, ASMA, AMA, IgM.  Avoid NSAIDs.  Avoid triggers.  Elevate head of bed.    Follow up in 3 months.  Thank you for this consultation.  ______________________________________________________________________    HPI:  Sanam Garner is a 24 y.o. old female with PMH of ADHD seen for heartburn, abdominal pain.  She has had symptoms in 2019.  Occasionally exacerbated by stress and certain foods.  Does take Pepcid/Prilosec as needed with some relief of her symptoms.  Denies any specific dysphagia, nausea, vomiting.  Bowel movements are normal.  Denies any blood in stool.  Denies tobacco or marijuana use.  Recently started GLP-1 agonist for weight loss.  She also has prediabetes.  She does not wish to be on regular medication at this time.  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    HEALTHCARE MAINTENANCE:   Hepatitis C screening negative in 2024.  Last EGD: None.  Last Colonoscopy: None.    REVIEW OF SYSTEMS:    Review of  Systems   Constitutional:  Negative for chills and fever.   HENT:  Negative for congestion and sinus pressure.    Respiratory:  Negative for cough and shortness of breath.    Cardiovascular:  Negative for chest pain, palpitations and leg swelling.   Gastrointestinal:  Positive for abdominal pain. Negative for constipation, diarrhea, nausea and vomiting.   Genitourinary:  Negative for dysuria, frequency and hematuria.   Musculoskeletal:  Negative for arthralgias, back pain and myalgias.   Skin:  Negative for color change and rash.   Neurological:  Negative for dizziness and headaches.   Psychiatric/Behavioral:  Negative for agitation and confusion.    All other systems reviewed and are negative.       Historical Information   Past Medical History:   Diagnosis Date    Acid reflux     ADHD (attention deficit hyperactivity disorder)     Anxiety     GERD (gastroesophageal reflux disease)      Past Surgical History:   Procedure Laterality Date    AUGMENTATION BREAST Right 8/15/2022    Procedure: AUGMENTATION BREAST;  Surgeon: Costa Case MD;  Location:  MAIN OR;  Service: Plastics    AR MASTOPEXY Bilateral 8/15/2022    Procedure: MASTOPEXY;  Surgeon: Costa Case MD;  Location:  MAIN OR;  Service: Plastics    WISDOM TOOTH EXTRACTION       Social History   Social History     Substance and Sexual Activity   Alcohol Use Yes    Alcohol/week: 2.0 standard drinks of alcohol    Types: 2 Standard drinks or equivalent per week    Comment: once a week or less     Social History     Substance and Sexual Activity   Drug Use Not Currently     Social History     Tobacco Use   Smoking Status Never   Smokeless Tobacco Never     Family History   Problem Relation Age of Onset    Anxiety disorder Mother     ADD / ADHD Father     Anxiety disorder Sister        Meds/Allergies     Not in a hospital admission.  No current facility-administered medications for this visit.    No Known Allergies    Objective     There were no vitals  taken for this visit. There is no height or weight on file to calculate BMI.    [unfilled]      PHYSICAL EXAM:      Physical Exam  Vitals and nursing note reviewed.   Constitutional:       General: She is not in acute distress.     Appearance: Normal appearance. She is not ill-appearing.   HENT:      Head: Normocephalic and atraumatic.      Mouth/Throat:      Mouth: Mucous membranes are moist.   Eyes:      Extraocular Movements: Extraocular movements intact.      Conjunctiva/sclera: Conjunctivae normal.   Cardiovascular:      Pulses: Normal pulses.   Pulmonary:      Effort: Pulmonary effort is normal.   Abdominal:      General: Abdomen is flat. Bowel sounds are normal. There is no distension.      Palpations: Abdomen is soft.      Tenderness: There is no abdominal tenderness. There is no guarding.   Skin:     General: Skin is warm and dry.   Neurological:      General: No focal deficit present.      Mental Status: She is alert and oriented to person, place, and time.   Psychiatric:         Mood and Affect: Mood normal.         Behavior: Behavior normal.         Lab Results:   No visits with results within 1 Day(s) from this visit.   Latest known visit with results is:   Telephone on 07/25/2024   Component Date Value    HEP C AB 01/29/2024 Negative     HIV Screen 01/29/2024 Non-Reactive     HIV Confirmation 01/29/2024 Non-Reactive     EXTERNAL GC  12/27/2023 Not Detected     CHLAMYDIA 12/27/2023 Not Detected        Imaging Studies: I have personally reviewed pertinent imaging studies.    Real Wan D.O.  Gastroenterology Fellow  PGY-6  Available via [employer]  9/24/2024 1:46 PM

## 2024-09-25 LAB
ACTIN IGG SERPL-ACNC: 2 UNITS (ref 0–19)
MITOCHONDRIA M2 IGG SER-ACNC: <20 UNITS (ref 0–20)

## 2024-10-04 LAB
APOB+LDLR+PCSK9 GENE MUT ANL BLD/T: NOT DETECTED
BRCA1+BRCA2 DEL+DUP + FULL MUT ANL BLD/T: NOT DETECTED
MLH1+MSH2+MSH6+PMS2 GN DEL+DUP+FUL M: NOT DETECTED

## 2024-10-08 ENCOUNTER — HOSPITAL ENCOUNTER (OUTPATIENT)
Dept: RADIOLOGY | Facility: IMAGING CENTER | Age: 24
Discharge: HOME | End: 2024-10-08
Payer: COMMERCIAL

## 2024-10-08 DIAGNOSIS — R74.8 ALKALINE PHOSPHATASE ELEVATION: ICD-10-CM

## 2024-10-08 PROCEDURE — 76705 ECHO EXAM OF ABDOMEN: CPT

## 2024-10-10 ENCOUNTER — ANESTHESIA (OUTPATIENT)
Dept: ANESTHESIOLOGY | Facility: HOSPITAL | Age: 24
End: 2024-10-10

## 2024-10-10 ENCOUNTER — ANESTHESIA EVENT (OUTPATIENT)
Dept: ANESTHESIOLOGY | Facility: HOSPITAL | Age: 24
End: 2024-10-10

## 2024-10-10 PROBLEM — B97.89 VIRAL TONSILLITIS: Status: RESOLVED | Noted: 2024-01-21 | Resolved: 2024-10-10

## 2024-10-10 PROBLEM — J03.80 VIRAL TONSILLITIS: Status: RESOLVED | Noted: 2024-01-21 | Resolved: 2024-10-10

## 2024-10-23 RX ORDER — SODIUM CHLORIDE, SODIUM LACTATE, POTASSIUM CHLORIDE, CALCIUM CHLORIDE 600; 310; 30; 20 MG/100ML; MG/100ML; MG/100ML; MG/100ML
100 INJECTION, SOLUTION INTRAVENOUS CONTINUOUS
Status: CANCELLED | OUTPATIENT
Start: 2024-10-23

## 2024-10-24 ENCOUNTER — HOSPITAL ENCOUNTER (OUTPATIENT)
Dept: GASTROENTEROLOGY | Facility: AMBULARY SURGERY CENTER | Age: 24
Setting detail: OUTPATIENT SURGERY
End: 2024-10-24
Payer: COMMERCIAL

## 2024-10-24 ENCOUNTER — ANESTHESIA EVENT (OUTPATIENT)
Dept: GASTROENTEROLOGY | Facility: AMBULARY SURGERY CENTER | Age: 24
End: 2024-10-24
Payer: COMMERCIAL

## 2024-10-24 VITALS
RESPIRATION RATE: 18 BRPM | SYSTOLIC BLOOD PRESSURE: 101 MMHG | TEMPERATURE: 97 F | BODY MASS INDEX: 25.64 KG/M2 | OXYGEN SATURATION: 97 % | HEIGHT: 68 IN | DIASTOLIC BLOOD PRESSURE: 62 MMHG | HEART RATE: 64 BPM | WEIGHT: 169.2 LBS

## 2024-10-24 DIAGNOSIS — K21.9 GASTROESOPHAGEAL REFLUX DISEASE WITHOUT ESOPHAGITIS: ICD-10-CM

## 2024-10-24 LAB
EXT PREGNANCY TEST URINE: NEGATIVE
EXT. CONTROL: NORMAL

## 2024-10-24 PROCEDURE — 43239 EGD BIOPSY SINGLE/MULTIPLE: CPT | Performed by: INTERNAL MEDICINE

## 2024-10-24 PROCEDURE — 88305 TISSUE EXAM BY PATHOLOGIST: CPT | Performed by: PATHOLOGY

## 2024-10-24 PROCEDURE — 88342 IMHCHEM/IMCYTCHM 1ST ANTB: CPT | Performed by: PATHOLOGY

## 2024-10-24 PROCEDURE — 88341 IMHCHEM/IMCYTCHM EA ADD ANTB: CPT | Performed by: PATHOLOGY

## 2024-10-24 PROCEDURE — 88313 SPECIAL STAINS GROUP 2: CPT | Performed by: PATHOLOGY

## 2024-10-24 PROCEDURE — 81025 URINE PREGNANCY TEST: CPT | Performed by: STUDENT IN AN ORGANIZED HEALTH CARE EDUCATION/TRAINING PROGRAM

## 2024-10-24 RX ORDER — FENTANYL CITRATE 50 UG/ML
INJECTION, SOLUTION INTRAMUSCULAR; INTRAVENOUS AS NEEDED
Status: DISCONTINUED | OUTPATIENT
Start: 2024-10-24 | End: 2024-10-24

## 2024-10-24 RX ORDER — PROPOFOL 10 MG/ML
INJECTION, EMULSION INTRAVENOUS AS NEEDED
Status: DISCONTINUED | OUTPATIENT
Start: 2024-10-24 | End: 2024-10-24

## 2024-10-24 RX ORDER — SODIUM CHLORIDE, SODIUM LACTATE, POTASSIUM CHLORIDE, CALCIUM CHLORIDE 600; 310; 30; 20 MG/100ML; MG/100ML; MG/100ML; MG/100ML
100 INJECTION, SOLUTION INTRAVENOUS CONTINUOUS
Status: DISPENSED | OUTPATIENT
Start: 2024-10-24

## 2024-10-24 RX ORDER — LIDOCAINE HYDROCHLORIDE 20 MG/ML
INJECTION, SOLUTION EPIDURAL; INFILTRATION; INTRACAUDAL; PERINEURAL AS NEEDED
Status: DISCONTINUED | OUTPATIENT
Start: 2024-10-24 | End: 2024-10-24

## 2024-10-24 RX ADMIN — SODIUM CHLORIDE, SODIUM LACTATE, POTASSIUM CHLORIDE, AND CALCIUM CHLORIDE: .6; .31; .03; .02 INJECTION, SOLUTION INTRAVENOUS at 12:58

## 2024-10-24 RX ADMIN — PROPOFOL 110 MG: 10 INJECTION, EMULSION INTRAVENOUS at 13:04

## 2024-10-24 RX ADMIN — LIDOCAINE HYDROCHLORIDE 100 MG: 20 INJECTION, SOLUTION EPIDURAL; INFILTRATION; INTRACAUDAL at 13:04

## 2024-10-24 RX ADMIN — FENTANYL CITRATE 50 MCG: 50 INJECTION INTRAMUSCULAR; INTRAVENOUS at 13:01

## 2024-10-24 RX ADMIN — PROPOFOL 50 MG: 10 INJECTION, EMULSION INTRAVENOUS at 13:09

## 2024-10-24 NOTE — ANESTHESIA POSTPROCEDURE EVALUATION
Post-Op Assessment Note    CV Status:  Stable  Pain Score: 0    Pain management: adequate       Mental Status:  Awake and alert   Hydration Status:  Stable   PONV Controlled:  None   Airway Patency:  Patent and adequate     Post Op Vitals Reviewed: Yes    No anethesia notable event occurred.    Staff: CRNA, Anesthesiologist           Last Filed PACU Vitals:  Vitals Value Taken Time   Temp     Pulse 67    /67    Resp 16    SpO2 99        Modified Carmen:  Activity: 2 (10/24/2024 11:55 AM)  Respiration: 2 (10/24/2024 11:55 AM)  Circulation: 2 (10/24/2024 11:55 AM)  Consciousness: 2 (10/24/2024 11:55 AM)  Oxygen Saturation: 2 (10/24/2024 11:55 AM)  Modified Carmen Score: 10 (10/24/2024 11:55 AM)         Statement Selected

## 2024-10-24 NOTE — ANESTHESIA PREPROCEDURE EVALUATION
Procedure:  EGD    Relevant Problems   ANESTHESIA (within normal limits)      CARDIO (within normal limits)      GI/HEPATIC   (+) Gastroesophageal reflux disease without esophagitis      GYN   (-) Currently pregnant      MUSCULOSKELETAL   (+) TMJ (dislocation of temporomandibular joint)      PULMONARY (within normal limits)   (-) Sleep apnea   (-) Smoking   (-) URI (upper respiratory infection)      Behavioral Health   (+) ADHD (attention deficit hyperactivity disorder), inattentive type      Physical Exam    Airway    Mallampati score: I  TM Distance: >3 FB  Neck ROM: full     Dental   No notable dental hx     Cardiovascular      Pulmonary      Other Findings  post-pubertal.      Anesthesia Plan  ASA Score- 2     Anesthesia Type- IV sedation with anesthesia with ASA Monitors.         Additional Monitors:     Airway Plan:            Plan Factors-Exercise tolerance (METS): >4 METS.    Chart reviewed.   Existing labs reviewed. Patient summary reviewed.    Patient is not a current smoker.              Induction- intravenous.    Postoperative Plan-         Informed Consent- Anesthetic plan and risks discussed with patient and mother.  I personally reviewed this patient with the CRNA. Discussed and agreed on the Anesthesia Plan with the CRNA..

## 2024-10-24 NOTE — H&P
"History and Physical -  [facility]  aSnam Garner 24 y.o. female MRN: [id number]                  HPI: Sanam Garner is a 24 y.o. year old female who presents for EGD for GERD      REVIEW OF SYSTEMS: Per the HPI, and otherwise unremarkable.    Historical Information   Past Medical History:   Diagnosis Date    Acid reflux     ADHD (attention deficit hyperactivity disorder)     Anxiety     GERD (gastroesophageal reflux disease)      Past Surgical History:   Procedure Laterality Date    AUGMENTATION BREAST Right 8/15/2022    Procedure: AUGMENTATION BREAST;  Surgeon: Costa Case MD;  Location:  MAIN OR;  Service: Plastics    MO MASTOPEXY Bilateral 8/15/2022    Procedure: MASTOPEXY;  Surgeon: Costa Case MD;  Location:  MAIN OR;  Service: Plastics    WISDOM TOOTH EXTRACTION       Social History   Social History     Substance and Sexual Activity   Alcohol Use Yes    Alcohol/week: 2.0 standard drinks of alcohol    Types: 2 Standard drinks or equivalent per week    Comment: once a week or less     Social History     Substance and Sexual Activity   Drug Use Not Currently     Social History     Tobacco Use   Smoking Status Never   Smokeless Tobacco Never     Family History   Problem Relation Age of Onset    Anxiety disorder Mother     ADD / ADHD Father     Anxiety disorder Sister        Meds/Allergies       Current Outpatient Medications:     drospirenone-ethinyl estradiol (SANJUANITA) 3-0.02 MG per tablet    methylphenidate (CONCERTA) 18 mg ER tablet    ondansetron (ZOFRAN) 4 mg tablet    Zepbound 2.5 MG/0.5ML auto-injector    Current Facility-Administered Medications:     lactated ringers infusion, 100 mL/hr, Intravenous, Continuous    No Known Allergies    Objective     /63   Pulse 65   Temp (!) 97 °F (36.1 °C) (Temporal)   Resp 18   Ht 5' 8\" (1.727 m)   Wt 76.7 kg (169 lb 3.2 oz)   LMP 09/23/2024   SpO2 99%   BMI 25.73 kg/m²       PHYSICAL EXAM    Gen: NAD  Head: NCAT  CV: " RRR  CHEST: Clear  ABD: soft, NT/ND  EXT: no edema      ASSESSMENT/PLAN:  This is a 24 y.o. year old female here for GERD, and she is stable and optimized for her procedure.

## 2024-10-30 ENCOUNTER — TELEPHONE (OUTPATIENT)
Dept: GASTROENTEROLOGY | Facility: CLINIC | Age: 24
End: 2024-10-30

## 2024-10-30 PROCEDURE — 88342 IMHCHEM/IMCYTCHM 1ST ANTB: CPT | Performed by: PATHOLOGY

## 2024-10-30 PROCEDURE — 88341 IMHCHEM/IMCYTCHM EA ADD ANTB: CPT | Performed by: PATHOLOGY

## 2024-10-30 PROCEDURE — 88305 TISSUE EXAM BY PATHOLOGIST: CPT | Performed by: PATHOLOGY

## 2024-10-30 PROCEDURE — 88313 SPECIAL STAINS GROUP 2: CPT | Performed by: PATHOLOGY

## 2024-10-30 NOTE — TELEPHONE ENCOUNTER
----- Message from Venita Foote MD sent at 10/30/2024 12:37 PM EDT -----  Biopsies from stomach and duodenum were negative for h pylori or celiac disease respectively

## 2024-10-30 NOTE — TELEPHONE ENCOUNTER
Patient seen results ion mychart, thank you        Written by Venita Foote MD on 10/30/2024 12:37 PM EDT View Full Comments  Seen by patient Sanam Garner on 10/30/2024 12:38 PM

## 2025-02-18 ENCOUNTER — APPOINTMENT (EMERGENCY)
Dept: RADIOLOGY | Facility: HOSPITAL | Age: 25
End: 2025-02-18
Payer: COMMERCIAL

## 2025-02-18 ENCOUNTER — HOSPITAL ENCOUNTER (EMERGENCY)
Facility: HOSPITAL | Age: 25
Discharge: HOME | End: 2025-02-19
Attending: EMERGENCY MEDICINE
Payer: COMMERCIAL

## 2025-02-18 VITALS
SYSTOLIC BLOOD PRESSURE: 124 MMHG | HEART RATE: 63 BPM | OXYGEN SATURATION: 100 % | DIASTOLIC BLOOD PRESSURE: 79 MMHG | BODY MASS INDEX: 26.15 KG/M2 | RESPIRATION RATE: 18 BRPM | TEMPERATURE: 97.6 F | WEIGHT: 171.96 LBS

## 2025-02-18 DIAGNOSIS — R07.9 CHEST PAIN: Primary | ICD-10-CM

## 2025-02-18 LAB
ALBUMIN SERPL BCG-MCNC: 4.5 G/DL (ref 3.5–5)
ALP SERPL-CCNC: 44 U/L (ref 34–104)
ALT SERPL W P-5'-P-CCNC: 14 U/L (ref 7–52)
ANION GAP SERPL CALCULATED.3IONS-SCNC: 7 MMOL/L (ref 4–13)
AST SERPL W P-5'-P-CCNC: 15 U/L (ref 13–39)
BILIRUB SERPL-MCNC: 0.47 MG/DL (ref 0.2–1)
BUN SERPL-MCNC: 21 MG/DL (ref 5–25)
CALCIUM SERPL-MCNC: 9.6 MG/DL (ref 8.4–10.2)
CARDIAC TROPONIN I PNL SERPL HS: <2 NG/L (ref ?–50)
CHLORIDE SERPL-SCNC: 100 MMOL/L (ref 96–108)
CO2 SERPL-SCNC: 30 MMOL/L (ref 21–32)
CREAT SERPL-MCNC: 0.79 MG/DL (ref 0.6–1.3)
D DIMER PPP FEU-MCNC: <0.27 UG/ML FEU
FLUAV AG UPPER RESP QL IA.RAPID: NEGATIVE
FLUBV AG UPPER RESP QL IA.RAPID: NEGATIVE
GFR SERPL CREATININE-BSD FRML MDRD: 105 ML/MIN/1.73SQ M
GLUCOSE SERPL-MCNC: 86 MG/DL (ref 65–140)
POTASSIUM SERPL-SCNC: 3.5 MMOL/L (ref 3.5–5.3)
PROT SERPL-MCNC: 7.1 G/DL (ref 6.4–8.4)
SARS-COV+SARS-COV-2 AG RESP QL IA.RAPID: NEGATIVE
SODIUM SERPL-SCNC: 137 MMOL/L (ref 135–147)

## 2025-02-18 PROCEDURE — 80053 COMPREHEN METABOLIC PANEL: CPT

## 2025-02-18 PROCEDURE — 87811 SARS-COV-2 COVID19 W/OPTIC: CPT

## 2025-02-18 PROCEDURE — 99285 EMERGENCY DEPT VISIT HI MDM: CPT

## 2025-02-18 PROCEDURE — 85025 COMPLETE CBC W/AUTO DIFF WBC: CPT

## 2025-02-18 PROCEDURE — 71046 X-RAY EXAM CHEST 2 VIEWS: CPT

## 2025-02-18 PROCEDURE — 85379 FIBRIN DEGRADATION QUANT: CPT

## 2025-02-18 PROCEDURE — 96360 HYDRATION IV INFUSION INIT: CPT

## 2025-02-18 PROCEDURE — 81025 URINE PREGNANCY TEST: CPT

## 2025-02-18 PROCEDURE — 36415 COLL VENOUS BLD VENIPUNCTURE: CPT

## 2025-02-18 PROCEDURE — 84484 ASSAY OF TROPONIN QUANT: CPT

## 2025-02-18 PROCEDURE — 99285 EMERGENCY DEPT VISIT HI MDM: CPT | Performed by: EMERGENCY MEDICINE

## 2025-02-18 PROCEDURE — 93005 ELECTROCARDIOGRAM TRACING: CPT

## 2025-02-18 PROCEDURE — 87804 INFLUENZA ASSAY W/OPTIC: CPT

## 2025-02-18 RX ORDER — MAGNESIUM HYDROXIDE/ALUMINUM HYDROXICE/SIMETHICONE 120; 1200; 1200 MG/30ML; MG/30ML; MG/30ML
30 SUSPENSION ORAL ONCE
Status: COMPLETED | OUTPATIENT
Start: 2025-02-18 | End: 2025-02-18

## 2025-02-18 RX ORDER — DICYCLOMINE HCL 20 MG
20 TABLET ORAL ONCE
Status: COMPLETED | OUTPATIENT
Start: 2025-02-18 | End: 2025-02-18

## 2025-02-18 RX ORDER — SUCRALFATE ORAL 1 G/10ML
1 SUSPENSION ORAL ONCE
Status: COMPLETED | OUTPATIENT
Start: 2025-02-18 | End: 2025-02-18

## 2025-02-18 RX ORDER — HYDROXYZINE HYDROCHLORIDE 25 MG/1
25 TABLET, FILM COATED ORAL ONCE
Status: DISCONTINUED | OUTPATIENT
Start: 2025-02-18 | End: 2025-02-19 | Stop reason: HOSPADM

## 2025-02-18 RX ADMIN — ALUMINUM HYDROXIDE, MAGNESIUM HYDROXIDE, AND DIMETHICONE 30 ML: 200; 20; 200 SUSPENSION ORAL at 23:28

## 2025-02-18 RX ADMIN — DICYCLOMINE HYDROCHLORIDE 20 MG: 20 TABLET ORAL at 23:27

## 2025-02-18 RX ADMIN — SUCRALFATE 1 G: 1 SUSPENSION ORAL at 23:28

## 2025-02-18 RX ADMIN — SODIUM CHLORIDE 1000 ML: 0.9 INJECTION, SOLUTION INTRAVENOUS at 23:14

## 2025-02-18 NOTE — Clinical Note
Sanam Garner was seen and treated in our emergency department on 2/18/2025.                Diagnosis:     Sanam  .    She may return on this date: 02/21/2025         If you have any questions or concerns, please don't hesitate to call.      Song Torres MD    ______________________________           _______________          _______________  Hospital Representative                              Date                                Time

## 2025-02-19 ENCOUNTER — TELEPHONE (OUTPATIENT)
Age: 25
End: 2025-02-19

## 2025-02-19 LAB
ATRIAL RATE: 63 BPM
BASOPHILS # BLD AUTO: 0.05 THOUSANDS/ΜL (ref 0–0.1)
BASOPHILS NFR BLD AUTO: 1 % (ref 0–1)
EOSINOPHIL # BLD AUTO: 0.11 THOUSAND/ΜL (ref 0–0.61)
EOSINOPHIL NFR BLD AUTO: 2 % (ref 0–6)
ERYTHROCYTE [DISTWIDTH] IN BLOOD BY AUTOMATED COUNT: 13.5 % (ref 11.6–15.1)
EXT PREGNANCY TEST URINE: NEGATIVE
EXT. CONTROL: NORMAL
HCT VFR BLD AUTO: 41.3 % (ref 34.8–46.1)
HGB BLD-MCNC: 13.2 G/DL (ref 11.5–15.4)
IMM GRANULOCYTES # BLD AUTO: 0.01 THOUSAND/UL (ref 0–0.2)
IMM GRANULOCYTES NFR BLD AUTO: 0 % (ref 0–2)
LYMPHOCYTES # BLD AUTO: 2.26 THOUSANDS/ΜL (ref 0.6–4.47)
LYMPHOCYTES NFR BLD AUTO: 37 % (ref 14–44)
MCH RBC QN AUTO: 28.5 PG (ref 26.8–34.3)
MCHC RBC AUTO-ENTMCNC: 32 G/DL (ref 31.4–37.4)
MCV RBC AUTO: 89 FL (ref 82–98)
MONOCYTES # BLD AUTO: 0.47 THOUSAND/ΜL (ref 0.17–1.22)
MONOCYTES NFR BLD AUTO: 8 % (ref 4–12)
NEUTROPHILS # BLD AUTO: 3.19 THOUSANDS/ΜL (ref 1.85–7.62)
NEUTS SEG NFR BLD AUTO: 52 % (ref 43–75)
NRBC BLD AUTO-RTO: 0 /100 WBCS
P AXIS: 36 DEGREES
PLATELET # BLD AUTO: 173 THOUSANDS/UL (ref 149–390)
PMV BLD AUTO: 11.8 FL (ref 8.9–12.7)
PR INTERVAL: 152 MS
QRS AXIS: 88 DEGREES
QRSD INTERVAL: 88 MS
QT INTERVAL: 420 MS
QTC INTERVAL: 429 MS
RBC # BLD AUTO: 4.63 MILLION/UL (ref 3.81–5.12)
T WAVE AXIS: 64 DEGREES
VENTRICULAR RATE: 63 BPM
WBC # BLD AUTO: 6.09 THOUSAND/UL (ref 4.31–10.16)

## 2025-02-19 PROCEDURE — 93010 ELECTROCARDIOGRAM REPORT: CPT | Performed by: INTERNAL MEDICINE

## 2025-02-19 NOTE — ED ATTENDING ATTESTATION
2/18/2025  I, Remington Gomez MD, saw and evaluated the patient. I have discussed the patient with the resident/non-physician practitioner and agree with the resident's/non-physician practitioner's findings, Plan of Care, and MDM as documented in the resident's/non-physician practitioner's note, except where noted. All available labs and Radiology studies were reviewed.  I was present for key portions of any procedure(s) performed by the resident/non-physician practitioner and I was immediately available to provide assistance.       At this point I agree with the current assessment done in the Emergency Department.  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  Stated that she felt lightheaded and foggy at the moment, needed to sit down to have symptoms improve.  She stated that during the episode she felt like she could not process thoughts as well, had difficult time remembering what exactly she was trying to say.  Denies any history of similar symptoms.  She also mentions intermittently she will have some left-sided chest pain and feels short of breath at random times over the past few weeks.  No similar symptoms at the moment.  Denies any recent injuries, illnesses.  She is using Ozempic for weight loss and recently stopped her oral contraceptive.  Has had irregular menses this month likely due to change in oral contraceptive.  Feels a little bit lightheaded at the time of exam.  Vitals normal.  Heart sounds normal.  Breath sounds are clear.  Well-appearing overall.    EKG normal sinus rhythm without acute ischemic changes or ectopy.  Pending labs.  She does work night shift and just started this about 2 months ago.  States that she has had poor sleep hygiene as a result.  Feels like her eating habits are good.  Symptoms may be due to sleep  hygiene, lifestyle factors.      ED Course  ED Course as of 02/19/25 0101   Tue Feb 18, 2025   2343 D-Dimer, Quant: <0.27   Wed Feb 19, 2025   0000 hs TnI 0hr: <2   0000 CMP negative.  Viral testing negative.     Overall workup unremarkable.  Plan discharge.  Follow-up with PCP if symptoms persist.    Critical Care Time  Procedures

## 2025-02-19 NOTE — ED PROVIDER NOTES
Time reflects when diagnosis was documented in both MDM as applicable and the Disposition within this note       Time User Action Codes Description Comment    2/19/2025 12:52 AM Song Torres Add [R07.9] Chest pain           ED Disposition       ED Disposition   Discharge    Condition   Stable    Date/Time   Wed Feb 19, 2025 12:52 AM    Comment   Sanam Garner discharge to home/self care.                   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  Differential includes but not limited to ACS, pleurisy, pneumonia, flu, COVID, RSV, GERD, gastritis, hiatal hernia, esophageal spasm, VTE, anxiety, generalized anxiety disorder, sleep disorder    Discussed GI cocktail and Atarax.  Labs within normal limits.  Dimer negative.  No ischemic changes on EKG.  Heart score 0.  No acute findings on chest x-ray.    Patient is otherwise well-appearing.  Discussed results.  Follow-up with PCP.  Patient advised that she would be reaching out to her psychiatrist.  Discharged home to self-care.    Amount and/or Complexity of Data Reviewed  Labs: ordered. Decision-making details documented in ED Course.  Radiology: ordered and independent interpretation performed.    Risk  OTC drugs.  Prescription drug management.        ED Course as of 02/19/25 0103   Tue Feb 18, 2025   2339 D-Dimer, Quant: <0.27   Wed Feb 19, 2025 0103 EKG normal sinus rhythm rate of 63, normal GA interval, normal QRS interval, QTc 429, normal axis, no ST elevation, no ST depressions, no ischemic changes or STEMI, no delta wave, no epsilon wave, no Brugada pattern.       Medications   hydrOXYzine HCL (ATARAX) tablet 25 mg (0 mg Oral Hold 2/18/25 2326)   sodium chloride 0.9 % bolus 1,000 mL (0 mL Intravenous Stopped 2/19/25 0030)   dicyclomine (BENTYL) tablet 20 mg (20 mg Oral Given 2/18/25 2327)    sucralfate (CARAFATE) oral suspension (BA/PEDS) 1 g (1 g Oral Given 2/18/25 2328)   aluminum-magnesium hydroxide-simethicone (MAALOX) oral suspension 30 mL (30 mL Oral Given 2/18/25 2328)       ED Risk Strat Scores      HEART Risk Score      Flowsheet Row Most Recent Value   Heart Score Risk Calculator    History 0 Filed at: 02/19/2025 0055   ECG 0 Filed at: 02/19/2025 0055   Age 0 Filed at: 02/19/2025 0055   Risk Factors 0 Filed at: 02/19/2025 0055   Troponin 0 Filed at: 02/19/2025 0055   HEART Score 0 Filed at: 02/19/2025 0055                                                  History of Present Illness       Chief Complaint   Patient presents with    Chest Pain     Reports working upstairs tonight, suddenly felt hot, lightheaded, +palpitations, +left side chest pain with slight SOB.  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 Hx of anxiety without chest pain.     Dizziness       Past Medical History:   Diagnosis Date    Acid reflux     ADHD (attention deficit hyperactivity disorder)     Anxiety     GERD (gastroesophageal reflux disease)       Past Surgical History:   Procedure Laterality Date    AUGMENTATION BREAST Right 8/15/2022    Procedure: AUGMENTATION BREAST;  Surgeon: Costa Case MD;  Location:  MAIN OR;  Service: Plastics    CA MASTOPEXY Bilateral 8/15/2022    Procedure: MASTOPEXY;  Surgeon: Costa Case MD;  Location:  MAIN OR;  Service: Plastics    WISDOM TOOTH EXTRACTION        Family History   Problem Relation Age of Onset    Anxiety disorder Mother     ADD / ADHD Father     Anxiety disorder Sister       Social History     Tobacco Use    Smoking status: Never    Smokeless tobacco: Never   Vaping Use    Vaping status: Former   Substance Use Topics    Alcohol use: Yes     Alcohol/week: 2.0 standard drinks of alcohol     Types: 2 Standard drinks or equivalent per week     Comment: once a week or less    Drug use:  Not Currently      E-Cigarette/Vaping    E-Cigarette Use Former User       E-Cigarette/Vaping Substances    Nicotine No     THC No     CBD No       I have reviewed and agree with the history as documented.     24-year-old female with history of GERD presents with chest pains.  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  Nuys previous history of the same.  Previous history of GERD on famotidine but states that sensation feels different.  Recently finished nursing school and started working and states increased stressors in the life.  Last menses approximately a week ago.  Nuys alcohol, tobacco, recreational drug use.  Denies fevers, chills, recent illness, nausea, vomiting, diaphoresis, weakness, abdominal pain, dysuria, frequency, urgency, recent long travel, history of VTE, hemoptysis, recent surgery, hormone use, leg swelling, leg pain.      Chest Pain  Associated symptoms: dizziness    Dizziness  Associated symptoms: chest pain        Review of Systems   Cardiovascular:  Positive for chest pain.   Neurological:  Positive for dizziness.   All other systems reviewed and are negative.          Objective       ED Triage Vitals [02/18/25 2245]   Temperature Pulse Blood Pressure Respirations SpO2 Patient Position - Orthostatic VS   97.6 °F (36.4 °C) 63 124/79 18 100 % --      Temp Source Heart Rate Source BP Location FiO2 (%) Pain Score    Oral Monitor -- -- --      Vitals      Date and Time Temp Pulse SpO2 Resp BP Pain Score FACES Pain Rating User   02/18/25 2245 97.6 °F (36.4 °C) 63 100 % 18 124/79 -- -- SH            Physical Exam  Vitals and nursing note reviewed.   Constitutional:       General: She is not in acute  distress.     Appearance: Normal appearance. She is normal weight. She is not ill-appearing, toxic-appearing or diaphoretic.   HENT:      Head: Normocephalic and atraumatic.      Right Ear: External ear normal.      Left Ear: External ear normal.      Nose: Nose normal.      Mouth/Throat:      Mouth: Mucous membranes are moist.      Pharynx: Oropharynx is clear.   Eyes:      General: No scleral icterus.        Right eye: No discharge.         Left eye: No discharge.      Extraocular Movements: Extraocular movements intact.   Neck:      Trachea: No tracheal deviation.   Cardiovascular:      Rate and Rhythm: Normal rate and regular rhythm.      Pulses: Normal pulses.           Radial pulses are 2+ on the right side and 2+ on the left side.        Posterior tibial pulses are 2+ on the right side and 2+ on the left side.      Heart sounds: Normal heart sounds. No murmur heard.     No S3 or S4 sounds.   Pulmonary:      Effort: Pulmonary effort is normal. No tachypnea, accessory muscle usage or respiratory distress.      Breath sounds: Normal breath sounds. No stridor. No decreased breath sounds, wheezing, rhonchi or rales.   Chest:      Chest wall: No mass, deformity, tenderness, crepitus or edema.   Abdominal:      Palpations: There is no hepatomegaly, splenomegaly or mass.      Tenderness: There is no abdominal tenderness. There is no guarding or rebound.   Musculoskeletal:      Cervical back: Neck supple.      Right lower leg: No tenderness. No edema.      Left lower leg: No tenderness. No edema.   Skin:     General: Skin is warm and dry.      Capillary Refill: Capillary refill takes less than 2 seconds.      Coloration: Skin is not cyanotic, jaundiced or pale.      Findings: No bruising, ecchymosis, erythema, lesion, petechiae or rash.      Nails: There is no clubbing.   Neurological:      General: No focal deficit present.      Mental Status: She is alert and oriented to person, place, and time. Mental status is at  baseline.         Results Reviewed       Procedure Component Value Units Date/Time    POCT pregnancy, urine [[id number]]  (Normal) Collected: 02/19/25 0030    Lab Status: Final result Updated: 02/19/25 0043     EXT Preg Test, Ur Negative     Control Valid    CBC and differential [[id number]] Collected: 02/18/25 2310    Lab Status: Final result Specimen: Blood from Arm, Left Updated: 02/19/25 0017     WBC 6.09 Thousand/uL      RBC 4.63 Million/uL      Hemoglobin 13.2 g/dL      Hematocrit 41.3 %      MCV 89 fL      MCH 28.5 pg      MCHC 32.0 g/dL      RDW 13.5 %      MPV 11.8 fL      Platelets 173 Thousands/uL      nRBC 0 /100 WBCs      Segmented % 52 %      Immature Grans % 0 %      Lymphocytes % 37 %      Monocytes % 8 %      Eosinophils Relative 2 %      Basophils Relative 1 %      Absolute Neutrophils 3.19 Thousands/µL      Absolute Immature Grans 0.01 Thousand/uL      Absolute Lymphocytes 2.26 Thousands/µL      Absolute Monocytes 0.47 Thousand/µL      Eosinophils Absolute 0.11 Thousand/µL      Basophils Absolute 0.05 Thousands/µL     HS Troponin 0hr (reflex protocol) [[id number]]  (Normal) Collected: 02/18/25 2310    Lab Status: Final result Specimen: Blood from Arm, Left Updated: 02/18/25 2349     hs TnI 0hr <2 ng/L     FLU/COVID Rapid Antigen (30 min. TAT) - Preferred screening test in ED [[id number]]  (Normal) Collected: 02/18/25 2310    Lab Status: Final result Specimen: Nares from Nose Updated: 02/18/25 2344     SARS COV Rapid Antigen Negative     Influenza A Rapid Antigen Negative     Influenza B Rapid Antigen Negative    Narrative:      This test has been performed using the Quidel Yoko 2 FLU+SARS Antigen test under the Emergency Use Authorization (EUA). This test has been validated by the  and verified by the performing laboratory. The Yoko uses lateral flow immunofluorescent sandwich assay to detect SARS-COV, Influenza A and Influenza B Antigen.     The Quidel Yoko 2 SARS Antigen test does not  differentiate between SARS-CoV and SARS-CoV-2.     Negative results are presumptive and may be confirmed with a molecular assay, if necessary, for patient management. Negative results do not rule out SARS-CoV-2 or influenza infection and should not be used as the sole basis for treatment or patient management decisions. A negative test result may occur if the level of antigen in a sample is below the limit of detection of this test.     Positive results are indicative of the presence of viral antigens, but do not rule out bacterial infection or co-infection with other viruses.     All test results should be used as an adjunct to clinical observations and other information available to the provider.    FOR PEDIATRIC PATIENTS - copy/paste COVID Guidelines URL to browser: [email or web address]    Comprehensive metabolic panel [[id number]] Collected: 02/18/25 2310    Lab Status: Final result Specimen: Blood from Arm, Left Updated: 02/18/25 1592     Sodium 137 mmol/L      Potassium 3.5 mmol/L      Chloride 100 mmol/L      CO2 30 mmol/L      ANION GAP 7 mmol/L      BUN 21 mg/dL      Creatinine 0.79 mg/dL      Glucose 86 mg/dL      Calcium 9.6 mg/dL      AST 15 U/L      ALT 14 U/L      Alkaline Phosphatase 44 U/L      Total Protein 7.1 g/dL      Albumin 4.5 g/dL      Total Bilirubin 0.47 mg/dL      eGFR 105 ml/min/1.73sq m     Narrative:      National Kidney Disease Foundation guidelines for Chronic Kidney Disease (CKD):     Stage 1 with normal or high GFR (GFR > 90 mL/min/1.73 square meters)    Stage 2 Mild CKD (GFR = 60-89 mL/min/1.73 square meters)    Stage 3A Moderate CKD (GFR = 45-59 mL/min/1.73 square meters)    Stage 3B Moderate CKD (GFR = 30-44 mL/min/1.73 square meters)    Stage 4 Severe CKD (GFR = 15-29 mL/min/1.73 square meters)    Stage 5 End Stage CKD (GFR <15 mL/min/1.73 square meters)  Note: GFR calculation is accurate only with a steady state creatinine     D-Dimer [[id number]]  (Normal) Collected: 02/18/25 2310    Lab Status: Final result Specimen: Blood from Arm, Left Updated: 02/18/25 2339     D-Dimer, Quant <0.27 ug/ml FEU             XR chest 2 views   ED Interpretation by Song Torres MD (02/18 2344)   No acute cardiopulmonary process noted.  No free air under the diaphragm.  No pneumothorax.  No consolidations.          Procedures    ED Medication and Procedure Management   Prior to Admission Medications   Prescriptions Last Dose Informant Patient Reported? Taking?   Zepbound 2.5 MG/0.5ML auto-injector  Self Yes No   methylphenidate (CONCERTA) 18 mg ER tablet  Self No No   Sig: Take 1 tablet (18 mg total) by mouth daily as needed (for ADHD symptoms) Max Daily Amount: 18 mg   ondansetron (ZOFRAN) 4 mg tablet  Self Yes No   Sig: Take 4 mg by mouth if needed for nausea or vomiting   Patient not taking: Reported on 9/17/2024      Facility-Administered Medications: None     Patient's Medications   Discharge Prescriptions    No medications on file     No discharge procedures on file.  ED SEPSIS DOCUMENTATION   Time reflects when diagnosis was documented in both MDM as applicable and the Disposition within this note       Time User Action Codes Description Comment    2/19/2025 12:52 AM Song Torres Add [R07.9] Chest pain                  Song Torres MD  02/19/25 0059       Song Torres MD  02/19/25 0103

## 2025-02-26 ENCOUNTER — OFFICE VISIT (OUTPATIENT)
Dept: PSYCHIATRY | Facility: CLINIC | Age: 25
End: 2025-02-26
Payer: COMMERCIAL

## 2025-02-26 DIAGNOSIS — F90.0 ADHD (ATTENTION DEFICIT HYPERACTIVITY DISORDER), INATTENTIVE TYPE: ICD-10-CM

## 2025-02-26 DIAGNOSIS — F41.1 GENERALIZED ANXIETY DISORDER: Primary | ICD-10-CM

## 2025-02-26 PROCEDURE — 99214 OFFICE O/P EST MOD 30 MIN: CPT | Performed by: NURSE PRACTITIONER

## 2025-02-26 RX ORDER — DROSPIRENONE AND ETHINYL ESTRADIOL 0.02-3(28)
1 KIT ORAL DAILY
COMMUNITY
Start: 2025-02-19

## 2025-02-26 RX ORDER — HYDROXYZINE PAMOATE 25 MG/1
25 CAPSULE ORAL 3 TIMES DAILY PRN
Qty: 90 CAPSULE | Refills: 1 | Status: SHIPPED | OUTPATIENT
Start: 2025-02-26

## 2025-02-26 NOTE — BH TREATMENT PLAN
TREATMENT PLAN (Medication Management Only)        [facility]    Name and Date of Birth:  Sanam Garner 24 y.o. 2000  Date of Treatment Plan: February 26, 2025  Diagnosis/Diagnoses:    1. Generalized anxiety disorder    2. ADHD (attention deficit hyperactivity disorder), inattentive type      Strengths/Personal Resources for Self-Care: willingness to work on problems.  Area/Areas of need (in own words): anxiety symptoms  1. Long Term Goal: continue improvement in acceptable anxiety level.  Target Date:6 months - 8/26/2025  Person/Persons responsible for completion of goal: Sanam  2.  Short Term Objective (s) - How will we reach this goal?:   A. Provider new recommended medication/dosage changes and/or continue medication(s): Medication changes: I am having Sanam Garner start on hydrOXYzine pamoate.  B. Get regular sleep every night .  C.  Meditation .  Target Date:6 months - 8/26/2025  Person/Persons Responsible for Completion of Goal: Sanam  Progress Towards Goals: continuing treatment  Treatment Modality: medication management every 1 month, medication education at every visit  Review due 180 days from date of this plan: 6 months - 8/26/2025  Expected length of service: ongoing treatment  My Physician/PA/NP and I have developed this plan together and I agree to work on the goals and objectives. I understand the treatment goals that were developed for my treatment.

## 2025-02-27 NOTE — ASSESSMENT & PLAN NOTE
Utilizes coping skills for her ADHD.  She states that the stimulant increases her anxiety so she has not been taking  it.

## 2025-02-27 NOTE — ASSESSMENT & PLAN NOTE
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 She is still acclimating to the new sleep routine and needs help with this acclimation.  She will initiate hydroxyzine 25mg PO TID PRN for her anxiety/sleep at this time. We will follow up in one month to reassess and will consider an SSRI if necessary. She has no anxiety when she is at home. Denies depression and denies SI.  Orders:    hydrOXYzine pamoate (VISTARIL) 25 mg capsule; Take 1 capsule (25 mg total) by mouth 3 (three) times a day as needed for anxiety

## 2025-02-27 NOTE — PSYCH
MEDICATION MANAGEMENT NOTE    Name: Sanam Garner      : 2000      MRN: [id number]  Encounter Provider: MARVA Posey  Encounter Date: 2025   Encounter department: [facility]    Insurance: Payor: BLUE CROSS / Plan: MISC BLUE CROSS / Product Type: Blue Fee for Service /      Reason for Visit: No chief complaint on file.  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 She is still acclimating to the new sleep routine and needs help with this acclimation.  She will initiate hydroxyzine 25mg PO TID PRN for her anxiety/sleep at this time. We will follow up in one month to reassess and will consider an SSRI if necessary. She has no anxiety when she is at home. Denies depression and denies SI.  Orders:    hydrOXYzine pamoate (VISTARIL) 25 mg capsule; Take 1 capsule (25 mg total) by mouth 3 (three) times a day as needed for anxiety    ADHD (attention deficit hyperactivity disorder), inattentive type  Utilizes coping skills for her ADHD.  She states that the stimulant increases her anxiety so she has not been taking  it.           Treatment Recommendations:    Educated about diagnosis and treatment modalities. Verbalizes understanding and agreement with the treatment plan.  Discussed self monitoring of symptoms, and symptom monitoring tools.  Discussed medications and if treatment adjustment was needed or desired.  Medication management every 1 month  Aware of 24 hour and weekend coverage for urgent situations accessed by calling [facility] main practice number  Crisis Plan update was completed during the session and updated Crisis Plan Note was provided to the patient  I am scheduling this patient out for greater than 3 months: No    Medications Risks/Benefits:      Risks, Benefits And Possible Side Effects Of Medications:    Risks, benefits,  and possible side effects of medications explained to Sanam and she (or legal representative) verbalizes understanding and agreement for treatment.  Risks of medications in pregnancy or becoming pregnant explained to Sanam. She verbalizes understanding and agrees to discuss treatment if planning to become pregnant, or if she become pregnant    Controlled Medication Discussion:     Not applicable      History of Present Illness     Sanam is seen today for a follow up for Generalized Anxiety Disorder. She states that she is getting worse since the last visit. She states that rates anxiety as 7 on a scale of 1 (best) to 10 (worst) and does not have depression. She denies any SI/HI.  Denies auditory and visual hallucinations . 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 She has been unable to sleep sufficiently .    She denies suicidal ideation, intent or plan at present; denies homicidal ideation, intent or plan at present.    She denies auditory hallucinations, denies visual hallucinations, has no delusional thinking.    She denies any side effects from medications.    HPI ROS Appetite Changes and Sleep:     She reports  fluctuating sleep due to working 3rd shift in a hospital , normal appetite, normal energy level    Review Of Systems: A review of systems is obtained and is negative except for the pertinent positives listed in HPI/Subjective above.      Current Rating Scores:     Current PHQ-9   PHQ-2/9 Depression Screening    Little interest or pleasure in doing things: 0 - not at all  Feeling down, depressed, or hopeless: 0 - not at all  Trouble falling or staying asleep, or sleeping too much: 3 - nearly every day  Feeling tired or having little energy: 0 - not at all  Poor appetite or overeatin - not at all  Feeling bad about yourself - or that you are a failure or have let yourself or your family down: 0 - not at all  Trouble concentrating on things,  such as reading the newspaper or watching television: 2 - more than half the days  Moving or speaking so slowly that other people could have noticed. Or the opposite - being so fidgety or restless that you have been moving around a lot more than usual: 0 - not at all  Thoughts that you would be better off dead, or of hurting yourself in some way: 0 - not at all  PHQ-9 Score: 5  PHQ-9 Interpretation: Mild depression       Current CARY-7   CARY-7 Flowsheet Screening      Flowsheet Row Most Recent Value   Over the last two weeks, how often have you been bothered by the following problems?     Feeling nervous, anxious, or on edge 3   Not being able to stop or control worrying 2   Worrying too much about different things 2   Trouble relaxing  1   Being so restless that it's hard to sit still 0   Becoming easily annoyed or irritable  0   Feeling afraid as if something awful might happen 1   How difficult have these problems made it for you to do your work, take care of things at home, or get along with other people?  Somewhat difficult   CARY Score  9            Areas of Improvement: reviewed in HPI/Subjective Section and reviewed in Assessment and Plan Section    Past Psychiatric History: (unchanged information from previous note copied and updated)    Hx ADHD as a child, hx of use of prozac.  Has not needed medication continuously and has been stable.      Traumatic History: (unchanged information from previous note copied and updated)    Denies any history of trauma    Past Medical History:   Diagnosis Date    Acid reflux     ADHD (attention deficit hyperactivity disorder)     Anxiety     GERD (gastroesophageal reflux disease)         Past Surgical History:   Procedure Laterality Date    AUGMENTATION BREAST Right 8/15/2022    Procedure: AUGMENTATION BREAST;  Surgeon: Costa Case MD;  Location:  MAIN OR;  Service: Plastics    AR MASTOPEXY Bilateral 8/15/2022    Procedure: MASTOPEXY;  Surgeon: Costa Case MD;   Location:  MAIN OR;  Service: Plastics    WISDOM TOOTH EXTRACTION       Allergies: No Known Allergies    Current Outpatient Medications   Medication Sig Dispense Refill    hydrOXYzine pamoate (VISTARIL) 25 mg capsule Take 1 capsule (25 mg total) by mouth 3 (three) times a day as needed for anxiety 90 capsule 1    Zepbound 2.5 MG/0.5ML auto-injector       methylphenidate (CONCERTA) 18 mg ER tablet Take 1 tablet (18 mg total) by mouth daily as needed (for ADHD symptoms) Max Daily Amount: 18 mg (Patient not taking: Reported on 2/26/2025) 30 tablet 0    ondansetron (ZOFRAN) 4 mg tablet Take 4 mg by mouth if needed for nausea or vomiting (Patient not taking: Reported on 9/17/2024)      Vestura 3-0.02 MG per tablet Take 1 tablet by mouth daily (Patient not taking: Reported on 2/26/2025)       No current facility-administered medications for this visit.       Substance Abuse History:    Social History     Substance and Sexual Activity   Alcohol Use Yes    Alcohol/week: 2.0 standard drinks of alcohol    Types: 2 Standard drinks or equivalent per week    Comment: once a week or less     Social History     Substance and Sexual Activity   Drug Use Not Currently       Social History:    Social History     Socioeconomic History    Marital status: Single     Spouse name: Not on file    Number of children: Not on file    Years of education: some college    Highest education level: Some college, no degree   Occupational History     Employer: [employer]   Tobacco Use    Smoking status: Never    Smokeless tobacco: Never   Vaping Use    Vaping status: Former   Substance and Sexual Activity    Alcohol use: Yes     Alcohol/week: 2.0 standard drinks of alcohol     Types: 2 Standard drinks or equivalent per week     Comment: once a week or less    Drug use: Not Currently    Sexual activity: Not Currently   Other Topics Concern    Not on file   Social History Narrative    Not on file     Social Drivers of Health      Financial Resource Strain: Not on file   Food Insecurity: Not on file   Transportation Needs: Not on file   Physical Activity: Not on file   Stress: Not on file   Social Connections: Not on file   Intimate Partner Violence: Not on file   Housing Stability: Not on file       Family Psychiatric History:     Family History   Problem Relation Age of Onset    Anxiety disorder Mother     ADD / ADHD Father     Anxiety disorder Sister        Medical History Reviewed by provider this encounter:  Tobacco  Allergies  Meds  Med Hx  Fam Hx          Objective   LMP 02/03/2025      Mental Status Evaluation:    Appearance age appropriate, casually dressed   Behavior cooperative, calm   Speech normal rate, normal volume, normal pitch, spontaneous   Mood anxious   Affect normal range and intensity, appropriate   Thought Processes organized, goal directed   Thought Content no overt delusions   Perceptual Disturbances: no auditory hallucinations, no visual hallucinations   Abnormal Thoughts  Risk Potential Suicidal ideation - None  Homicidal ideation - None  Potential for aggression - No   Orientation oriented to person, place, time/date, and situation   Memory recent and remote memory grossly intact   Consciousness alert and awake   Attention Span Concentration Span attention span and concentration are age appropriate   Intellect appears to be of average intelligence   Insight intact   Judgement intact   Muscle Strength and  Gait normal muscle strength and normal muscle tone, normal gait and normal balance   Motor activity no abnormal movements   Language no difficulty naming common objects, no difficulty repeating a phrase, no difficulty writing a sentence   Fund of Knowledge adequate knowledge of current events  adequate fund of knowledge regarding past history  adequate fund of knowledge regarding vocabulary    Pain none   Pain Scale 0       Laboratory Results: I have personally reviewed all pertinent laboratory/tests  results    Last Visit Labs:   Admission on 02/18/2025, Discharged on 02/19/2025   Component Date Value    Ventricular Rate 02/18/2025 63     Atrial Rate 02/18/2025 63     AK Interval 02/18/2025 152     QRSD Interval 02/18/2025 88     QT Interval 02/18/2025 420     QTC Interval 02/18/2025 429     P Axis 02/18/2025 36     QRS Axis 02/18/2025 88     T Wave [place] 02/18/2025 64     WBC 02/18/2025 6.09     RBC 02/18/2025 4.63     Hemoglobin 02/18/2025 13.2     Hematocrit 02/18/2025 41.3     MCV 02/18/2025 89     MCH 02/18/2025 28.5     MCHC 02/18/2025 32.0     RDW 02/18/2025 13.5     MPV 02/18/2025 11.8     Platelets 02/18/2025 173     nRBC 02/18/2025 0     Segmented % 02/18/2025 52     Immature Grans % 02/18/2025 0     Lymphocytes % 02/18/2025 37     Monocytes % 02/18/2025 8     Eosinophils Relative 02/18/2025 2     Basophils Relative 02/18/2025 1     Absolute Neutrophils 02/18/2025 3.19     Absolute Immature Grans 02/18/2025 0.01     Absolute Lymphocytes 02/18/2025 2.26     Absolute Monocytes 02/18/2025 0.47     Eosinophils Absolute 02/18/2025 0.11     Basophils Absolute 02/18/2025 0.05     Sodium 02/18/2025 137     Potassium 02/18/2025 3.5     Chloride 02/18/2025 100     CO2 02/18/2025 30     ANION GAP 02/18/2025 7     BUN 02/18/2025 21     Creatinine 02/18/2025 0.79     Glucose 02/18/2025 86     Calcium 02/18/2025 9.6     AST 02/18/2025 15     ALT 02/18/2025 14     Alkaline Phosphatase 02/18/2025 44     Total Protein 02/18/2025 7.1     Albumin 02/18/2025 4.5     Total Bilirubin 02/18/2025 0.47     eGFR 02/18/2025 105     hs TnI 0hr 02/18/2025 <2     SARS COV Rapid Antigen 02/18/2025 Negative     Influenza A Rapid Antigen 02/18/2025 Negative     Influenza B Rapid Antigen 02/18/2025 Negative     EXT Preg Test, Ur 02/19/2025 Negative     Control 02/19/2025 Valid     D-Dimer, Quant 02/18/2025 <0.27        Suicide/Homicide Risk Assessment:    Risk of Harm to Self:  The following ratings are based on assessment at the time  of the interview  Demographic Risk Factors include: , age: young adult (15-24)  Historical Risk Factors include: chronic anxiety symptoms  Current Specific Risk Factors include: current anxiety symptoms  Protective Factors: no current suicidal ideation, access to mental health treatment, compliant with medications, compliant with mental health treatment, future oriented, good self-esteem, having a desire to live, stable living environment, stable job, sense of determination, strong relationships, supportive family  Weapons/Firearms: none. The following steps have been taken to ensure weapons are properly secured: not applicable  Based on today's assessment, Sanam presents the following risk of harm to self: low    Risk of Harm to Others:  The following ratings are based on assessment at the time of the interview  Protective Factors: no current homicidal ideation  Based on today's assessment, Sanam presents the following risk of harm to others: none    The following interventions are recommended: Continue medication management. Continue psychotherapy. Safety plan completed/updated and signed.    Psychotherapy Provided:     Individual psychotherapy provided: Yes    Medications, treatment progress and treatment plan reviewed with Sanam.  Medication changes discussed with Sanam.  Medication education provided to Sanam.  Recent stressor including job stress and occasional anxiety discussed with Sanam.   Coping skills including exercising, getting into a good routine, taking time for self, and taking walks reviewed with Sanam.   Supportive therapy provided.   Reassurance and supportive therapy provided.   Crisis/safety plan discussed with Sanam.    Treatment Plan:    Completed and signed during the session: Yes - with Sanam    Goals: Progress towards Treatment Plan goals - Yes, progressing, as evidenced by subjective findings in HPI/Subjective Section and in Assessment and Plan Section    Depression Follow-up Plan  Completed: Not applicable    Note Share:    This note was not shared with the patient due to this is a psychotherapy note    Administrative Statements       Visit Time  Visit Start Time: 1530 PM  Visit Stop Time: 1550 PM  Total Visit Duration:  20 minutes    MARVA Posey 02/27/25

## 2025-04-02 ENCOUNTER — TELEPHONE (OUTPATIENT)
Age: 25
End: 2025-04-02

## 2025-04-02 NOTE — TELEPHONE ENCOUNTER
Patient is calling regarding cancelling an appointment.    Date/Time: 4/3/25 at 2pm    Reason: work conflict    Patient was rescheduled: YES [x] NO []  If yes, when was Patient reschedule for: 4/10/25 at 1:30p     Patient requesting call back to reschedule: YES [] NO [x]

## 2025-04-10 ENCOUNTER — TELEMEDICINE (OUTPATIENT)
Dept: PSYCHIATRY | Facility: CLINIC | Age: 25
End: 2025-04-10
Payer: COMMERCIAL

## 2025-04-10 DIAGNOSIS — F41.1 GENERALIZED ANXIETY DISORDER: Primary | ICD-10-CM

## 2025-04-10 PROCEDURE — 99213 OFFICE O/P EST LOW 20 MIN: CPT | Performed by: NURSE PRACTITIONER

## 2025-04-10 RX ORDER — HYDROXYZINE HYDROCHLORIDE 25 MG/1
25 TABLET, FILM COATED ORAL EVERY 6 HOURS PRN
Qty: 60 TABLET | Refills: 2 | Status: SHIPPED | OUTPATIENT
Start: 2025-04-10

## 2025-04-11 NOTE — PSYCH
MEDICATION MANAGEMENT NOTE    Name: Sanam Garner      : 2000      MRN: [id number]  Encounter Provider: MARVA Posey  Encounter Date: 4/10/2025   Encounter department: [facility]    Insurance: Payor: BLUE CROSS / Plan: MISC BLUE CROSS / Product Type: Blue Fee for Service /      Reason for Visit: No chief complaint on file.  :  Assessment & Plan  Generalized anxiety disorder  Patient stable. Continue hydroxyzine 25mg PO Q6PRN for anxiety.   Orders:    hydrOXYzine HCL (ATARAX) 25 mg tablet; Take 1 tablet (25 mg total) by mouth every 6 (six) hours as needed for anxiety        Treatment Recommendations:    Educated about diagnosis and treatment modalities. Verbalizes understanding and agreement with the treatment plan.  Discussed self monitoring of symptoms, and symptom monitoring tools.  Discussed medications and if treatment adjustment was needed or desired.  Medication management every 3 months  Aware of need to follow up with family physician for medical issues  Aware of 24 hour and weekend coverage for urgent situations accessed by calling [facility] main practice number  I am scheduling this patient out for greater than 3 months: No    Medications Risks/Benefits:      Risks, Benefits And Possible Side Effects Of Medications:    Risks, benefits, and possible side effects of medications explained to Sanam and she (or legal representative) verbalizes understanding and agreement for treatment.  Risks of medications in pregnancy or becoming pregnant explained to Sanam. She verbalizes understanding and agrees to discuss treatment if planning to become pregnant, or if she become pregnant.    Controlled Medication Discussion:     Not applicable      History of Present Illness     CC: Sanam presents today for follow up on 4/10/25. She is calm and appropriate in conversation.  She states that she is adjusting to her work schedule better, and her sleep  is much better.  She states her appetite is good.  She denies suicidal and homicidal ideation.  She denies any depression, reports her anxiety is rated at 2 out of 10.  She states her concentration and focus are baseline.  She denies any auditory or visual hallucinations.  She uses the hydroxyzine as needed for her anxiety and sleep.  She will continue her medication as ordered, follow-up in 3 months.  She will call sooner with any concerns or issues if they arise prior to scheduled appointment.     Sanam Verbalized understanding and is in agreement with the treatment plan as outlined today.       Med Compliance: yes    Since our last visit, overall symptoms have been stable.       HPI ROS:     Medication Side Effects: denies  Depression: 0 /10 (10 worst)  Anxiety: 2 /10 (10 worst)  Safety concerns (SI, HI, others): denies  Sleep: good  Energy: good  Appetite: good    Sanam denies any side effects from medications unless noted above.    Review Of Systems: A review of systems is obtained and is negative except for the pertinent positives listed in HPI/Subjective above.      Current Rating Scores:     Current PHQ-9   PHQ-2/9 Depression Screening    Little interest or pleasure in doing things: 0 - not at all  Feeling down, depressed, or hopeless: 0 - not at all  Trouble falling or staying asleep, or sleeping too much: 1 - several days  Feeling tired or having little energy: 0 - not at all  Poor appetite or overeatin - not at all  Feeling bad about yourself - or that you are a failure or have let yourself or your family down: 0 - not at all  Trouble concentrating on things, such as reading the newspaper or watching television: 1 - several days  Moving or speaking so slowly that other people could have noticed. Or the opposite - being so fidgety or restless that you have been moving around a lot more than usual: 0 - not at all  Thoughts that you would be better off dead, or of hurting yourself in some way: 0 - not at  all  PHQ-9 Score: 2  PHQ-9 Interpretation: No or Minimal depression       Current CARY-7   CARY-7 Flowsheet Screening      Flowsheet Row Most Recent Value   Over the last two weeks, how often have you been bothered by the following problems?     Feeling nervous, anxious, or on edge 0   Not being able to stop or control worrying 0   Worrying too much about different things 1   Trouble relaxing  0   Being so restless that it's hard to sit still 0   Becoming easily annoyed or irritable  0   Feeling afraid as if something awful might happen 0   How difficult have these problems made it for you to do your work, take care of things at home, or get along with other people?  Not difficult at all   CARY Score  1            Areas of Improvement: reviewed in HPI/Subjective Section and reviewed in Assessment and Plan Section    Past Medical History:   Diagnosis Date    Acid reflux     ADHD (attention deficit hyperactivity disorder)     Anxiety     GERD (gastroesophageal reflux disease)      Past Surgical History:   Procedure Laterality Date    AUGMENTATION BREAST Right 8/15/2022    Procedure: AUGMENTATION BREAST;  Surgeon: Costa Case MD;  Location:  MAIN OR;  Service: Plastics    AR MASTOPEXY Bilateral 8/15/2022    Procedure: MASTOPEXY;  Surgeon: Costa Case MD;  Location:  MAIN OR;  Service: Plastics    WISDOM TOOTH EXTRACTION       Allergies: No Known Allergies    Current Outpatient Medications   Medication Instructions    hydrOXYzine HCL (ATARAX) 25 mg, Oral, Every 6 hours PRN    methylphenidate (CONCERTA) 18 mg, Oral, Daily PRN    ondansetron (ZOFRAN) 4 mg, As needed    Vestura 3-0.02 MG per tablet 1 tablet, Daily    Zepbound 2.5 MG/0.5ML auto-injector         Substance Abuse History:    Tobacco, Alcohol and Drug Use History     Tobacco Use    Smoking status: Never    Smokeless tobacco: Never   Vaping Use    Vaping status: Former   Substance Use Topics    Alcohol use: Yes     Alcohol/week: 2.0 standard drinks of  alcohol     Types: 2 Standard drinks or equivalent per week     Comment: once a week or less    Drug use: Not Currently     Alcohol Use: Not At Risk (8/19/2019)    [facility]    AUDIT-C     Frequency of Alcohol Consumption: Never       Social History:    Social History     Socioeconomic History    Marital status: Single     Spouse name: Not on file    Number of children: Not on file    Years of education: some college    Highest education level: Some college, no degree   Occupational History     Employer: [employer]   Other Topics Concern    Not on file   Social History Narrative    Not on file        Family Psychiatric History:     Family History   Problem Relation Age of Onset    Anxiety disorder Mother     ADD / ADHD Father     Anxiety disorder Sister        Medical History Reviewed by provider this encounter:  Meds          Objective   There were no vitals taken for this visit.     Mental Status Evaluation:    Appearance age appropriate, casually dressed   Behavior pleasant, cooperative, calm   Speech normal rate, normal volume, normal pitch, spontaneous   Mood euthymic   Affect normal range and intensity, appropriate   Thought Processes organized, goal directed   Thought Content no overt delusions   Perceptual Disturbances: no auditory hallucinations, no visual hallucinations   Abnormal Thoughts  Risk Potential Suicidal ideation - None  Homicidal ideation - None  Potential for aggression - No   Orientation oriented to person, place, time/date, and situation   Memory recent and remote memory grossly intact   Consciousness alert and awake   Attention Span Concentration Span attention span and concentration are age appropriate   Intellect appears to be of average intelligence   Insight intact   Judgement intact   Muscle Strength and  Gait normal muscle strength and normal muscle tone, normal gait and normal balance   Motor activity no  abnormal movements   Language no difficulty naming common objects, no difficulty repeating a phrase, no difficulty writing a sentence   Fund of Knowledge adequate knowledge of current events  adequate fund of knowledge regarding past history  adequate fund of knowledge regarding vocabulary    Pain none   Pain Scale 0       Laboratory Results: I have personally reviewed all pertinent laboratory/tests results        Suicide/Homicide Risk Assessment:    Risk of Harm to Self:  The following ratings are based on assessment at the time of the interview  Demographic Risk Factors include: , age: young adult (15-24)  Historical Risk Factors include: chronic anxiety symptoms  Recent Specific Risk Factors include: current anxiety symptoms  Protective Factors: no current suicidal ideation, access to mental health treatment, compliant with medications, compliant with mental health treatment, effective problem solving skills, having a desire to live, having a sense of purpose or meaning in life, stable living environment, stable job, strong relationships, supportive family  Weapons/Firearms: guns. The following steps have been taken to ensure weapons are properly secured: locked, no access  Based on today's assessment, Sanam presents the following risk of harm to self: minimal    Risk of Harm to Others:  The following ratings are based on assessment at the time of the interview  Protective Factors: no current homicidal ideation  Based on today's assessment, Sanam presents the following risk of harm to others: none**    The following interventions are recommended: Continue medication management. Safety plan completed/updated and signed. No other intervention changes indicated at this time.    Psychotherapy Provided:     Individual psychotherapy provided: Yes    Medication education provided to Sanam.  Recent stressor including occasional anxiety discussed with Sanam.   Coping skills including exercising, getting regular sleep   reviewed with Sanam.   Importance of medication and treatment compliance reviewed with Sanam.  Reassurance and supportive therapy provided.   Reoriented to reality and reassured.   Crisis/safety plan discussed with Sanam.    Treatment Plan:    Completed and signed during the session: Not applicable - Treatment Plan not due at this session.    Goals: Progress towards Treatment Plan goals - Yes, progressing, as evidenced by subjective findings in HPI/Subjective Section and in Assessment and Plan Section    Depression Follow-up Plan Completed: Not applicable    Note Share:    This note was not shared with the patient due to this is a psychotherapy note    Administrative Statements   Administrative Statements   Encounter provider MARVA Posey    The Patient is located at Other and in the following state in which I hold an active license [place].    The patient was identified by name and date of birth. Sanam Adelina Garner was informed that this is a telemedicine visit and that the visit is being conducted through the Epic Embedded platform. She agrees to proceed..  My office door was closed. No one else was in the room.  She acknowledged consent and understanding of privacy and security of the video platform. The patient has agreed to participate and understands they can discontinue the visit at any time.    I have spent a total time of 15 minutes in caring for this patient on the day of the visit/encounter including Risks and benefits of tx options, Instructions for management, Patient and family education, Importance of tx compliance, Risk factor reductions, Impressions, Documenting in the medical record, Reviewing/placing orders in the medical record (including tests, medications, and/or procedures), and Obtaining or reviewing history  , not including the time spent for establishing the audio/video connection.    Visit Time  Visit Start Time: 1330  Visit Stop Time: 1345  Total Visit Duration:  15 minutes    Blanca LENNON  MARVA Mahajan 04/11/25

## 2025-04-11 NOTE — ASSESSMENT & PLAN NOTE
Patient stable. Continue hydroxyzine 25mg PO Q6PRN for anxiety.   Orders:    hydrOXYzine HCL (ATARAX) 25 mg tablet; Take 1 tablet (25 mg total) by mouth every 6 (six) hours as needed for anxiety

## 2025-07-10 ENCOUNTER — TELEMEDICINE (OUTPATIENT)
Dept: PSYCHIATRY | Facility: CLINIC | Age: 25
End: 2025-07-10
Payer: COMMERCIAL

## 2025-07-10 DIAGNOSIS — F90.0 ADHD (ATTENTION DEFICIT HYPERACTIVITY DISORDER), INATTENTIVE TYPE: ICD-10-CM

## 2025-07-10 DIAGNOSIS — F41.1 GENERALIZED ANXIETY DISORDER: Primary | ICD-10-CM

## 2025-07-10 PROCEDURE — 99213 OFFICE O/P EST LOW 20 MIN: CPT | Performed by: NURSE PRACTITIONER

## 2025-07-10 RX ORDER — CLINDAMYCIN PHOSPHATE 10 UG/ML
LOTION TOPICAL
COMMUNITY
Start: 2025-07-09

## 2025-07-14 NOTE — PSYCH
MEDICATION MANAGEMENT NOTE    Name: Sanam Garner      : 2000      MRN: [id number]  Encounter Provider: MARVA Posey  Encounter Date: 7/10/2025   Encounter department: [facility]    Insurance: Payor: BLUE CROSS / Plan: MISC BLUE CROSS / Product Type: Blue Fee for Service /      Reason for Visit:   Chief Complaint   Patient presents with    Virtual Regular Visit   :  Assessment & Plan  Generalized anxiety disorder  Patient stable. Continue hydroxyzine 25mg PO Q6PRN for anxiety.           ADHD (attention deficit hyperactivity disorder), inattentive type  Utilizes coping skills for her ADHD.  She states that the stimulant increases her anxiety so she has not been taking it.              Treatment Recommendations:    Educated about diagnosis and treatment modalities. Verbalizes understanding and agreement with the treatment plan.  Discussed self monitoring of symptoms, and symptom monitoring tools.  Discussed medications and if treatment adjustment was needed or desired.  Medication management every as needed .  Aware of need to follow up with family physician for medical issues  Aware of 24 hour and weekend coverage for urgent situations accessed by calling [facility] main practice number  I am scheduling this patient out for greater than 3 months: Patient will call if needed. She currently is not being prescribed any medication and has been stable.    Medications Risks/Benefits:      Risks, Benefits And Possible Side Effects Of Medications:    Risks, benefits, and possible side effects of medications explained to Sanam and she (or legal representative) verbalizes understanding and agreement for treatment.    Controlled Medication Discussion:     Not applicable      History of Present Illness     CC: Sanam presents today for follow up on 7/10/25. She reports she has been doing well and has not been taking any medications. States her anxiety is  under control and she has not been utilizing stimulants for ADHD, only coping skills. Reports she has no depression, rates her anxiety 1/10. Denies SI/HI. Denies AVHall. She is forward thinking, planning trips and looking forward to hanging out with friends. She is sleeping and eating appropriately. She will follow up as needed.        Sanam The patient will call this provider sooner if concerns or issues arise prior to scheduled appointment. Verbalized understanding and is in agreement with the treatment plan as outlined today.      Med Compliance: n/a    Since our last visit, overall symptoms have been stable.       HPI ROS:     Medication Side Effects: denies  Depression: 0 /10 (10 worst)  Anxiety: 1 /10 (10 worst)  Safety concerns (SI, HI, others): denies  Sleep: good  Energy: good  Appetite: good    Sanam denies any side effects from medications unless noted above.    Review Of Systems: A review of systems is obtained and is negative except for the pertinent positives listed in HPI/Subjective above.      Current Rating Scores:     Current PHQ-9   PHQ-2/9 Depression Screening    Little interest or pleasure in doing things: 0 - not at all  Feeling down, depressed, or hopeless: 0 - not at all  Trouble falling or staying asleep, or sleeping too much: 0 - not at all  Feeling tired or having little energy: 0 - not at all  Poor appetite or overeatin - several days  Feeling bad about yourself - or that you are a failure or have let yourself or your family down: 1 - several days  Trouble concentrating on things, such as reading the newspaper or watching television: 1 - several days  Moving or speaking so slowly that other people could have noticed. Or the opposite - being so fidgety or restless that you have been moving around a lot more than usual: 0 - not at all  Thoughts that you would be better off dead, or of hurting yourself in some way: 0 - not at all  PHQ-9 Score: 3  PHQ-9 Interpretation: No or Minimal  depression       Current CARY-7   CARY-7 Flowsheet Screening      Flowsheet Row Most Recent Value   Over the last two weeks, how often have you been bothered by the following problems?     Feeling nervous, anxious, or on edge 0   Not being able to stop or control worrying 0   Worrying too much about different things 0   Trouble relaxing  0   Being so restless that it's hard to sit still 0   Becoming easily annoyed or irritable  0   Feeling afraid as if something awful might happen 0   CARY Score  0            Areas of Improvement: reviewed in HPI/Subjective Section and reviewed in Assessment and Plan Section      Past Medical History[1]  Past Surgical History[2]  Allergies: Allergies[3]    Current Outpatient Medications   Medication Instructions    clindamycin (CLEOCIN T) 1 % lotion     hydrOXYzine HCL (ATARAX) 25 mg, Oral, Every 6 hours PRN    methylphenidate (CONCERTA) 18 mg, Oral, Daily PRN    ondansetron (ZOFRAN) 4 mg, As needed    Vestura 3-0.02 MG per tablet 1 tablet, Daily    Zepbound 2.5 MG/0.5ML auto-injector         Substance Abuse History:    Tobacco, Alcohol and Drug Use History     Tobacco Use    Smoking status: Never    Smokeless tobacco: Never   Vaping Use    Vaping status: Former   Substance Use Topics    Alcohol use: Yes     Alcohol/week: 2.0 standard drinks of alcohol     Types: 2 Standard drinks or equivalent per week     Comment: once a week or less    Drug use: Not Currently     Alcohol Use: Not At Risk (8/19/2019)    Received from [facility]    AUDIT-C     Frequency of Alcohol Consumption: Never       Social History:    Social History     Socioeconomic History    Marital status: Single     Spouse name: Not on file    Number of children: Not on file    Years of education: some college    Highest education level: Some college, no degree   Occupational History     Employer: [employer]   Other Topics Concern    Not on file   Social History Narrative    Not on file         Family Psychiatric History:     Family History[4]    Medical History Reviewed by provider this encounter:  Tobacco  Meds  Problems  Med Hx  Fam Hx          Objective   There were no vitals taken for this visit.     Mental Status Evaluation:    Appearance age appropriate, casually dressed, dressed appropriately   Behavior pleasant, cooperative, calm   Speech normal rate, normal volume, normal pitch, spontaneous   Mood euthymic   Affect normal range and intensity, appropriate   Thought Processes organized, goal directed   Thought Content no overt delusions   Perceptual Disturbances: no auditory hallucinations, no visual hallucinations   Abnormal Thoughts  Risk Potential Suicidal ideation - None  Homicidal ideation - None  Potential for aggression - No   Orientation oriented to person, place, time/date, and situation   Memory recent and remote memory grossly intact   Consciousness alert and awake   Attention Span Concentration Span attention span and concentration are age appropriate   Intellect appears to be of average intelligence   Insight intact   Judgement intact   Muscle Strength and  Gait normal muscle strength and normal muscle tone, normal gait and normal balance   Motor activity no abnormal movements   Language no difficulty naming common objects, no difficulty repeating a phrase, no difficulty writing a sentence   Fund of Knowledge adequate knowledge of current events  adequate fund of knowledge regarding past history  adequate fund of knowledge regarding vocabulary        Laboratory Results: I have personally reviewed all pertinent laboratory/tests results    Recent Labs (last 2 months):   No visits with results within 2 Month(s) from this visit.   Latest known visit with results is:   Admission on 02/18/2025, Discharged on 02/19/2025   Component Date Value    Ventricular Rate 02/18/2025 63     Atrial Rate 02/18/2025 63     PA Interval 02/18/2025 152     QRSD Interval 02/18/2025 88     QT Interval  02/18/2025 420     QTC Interval 02/18/2025 429     P Axis 02/18/2025 36     QRS Axis 02/18/2025 88     T Wave Council 02/18/2025 64     WBC 02/18/2025 6.09     RBC 02/18/2025 4.63     Hemoglobin 02/18/2025 13.2     Hematocrit 02/18/2025 41.3     MCV 02/18/2025 89     MCH 02/18/2025 28.5     MCHC 02/18/2025 32.0     RDW 02/18/2025 13.5     MPV 02/18/2025 11.8     Platelets 02/18/2025 173     nRBC 02/18/2025 0     Segmented % 02/18/2025 52     Immature Grans % 02/18/2025 0     Lymphocytes % 02/18/2025 37     Monocytes % 02/18/2025 8     Eosinophils Relative 02/18/2025 2     Basophils Relative 02/18/2025 1     Absolute Neutrophils 02/18/2025 3.19     Absolute Immature Grans 02/18/2025 0.01     Absolute Lymphocytes 02/18/2025 2.26     Absolute Monocytes 02/18/2025 0.47     Eosinophils Absolute 02/18/2025 0.11     Basophils Absolute 02/18/2025 0.05     Sodium 02/18/2025 137     Potassium 02/18/2025 3.5     Chloride 02/18/2025 100     CO2 02/18/2025 30     ANION GAP 02/18/2025 7     BUN 02/18/2025 21     Creatinine 02/18/2025 0.79     Glucose 02/18/2025 86     Calcium 02/18/2025 9.6     AST 02/18/2025 15     ALT 02/18/2025 14     Alkaline Phosphatase 02/18/2025 44     Total Protein 02/18/2025 7.1     Albumin 02/18/2025 4.5     Total Bilirubin 02/18/2025 0.47     eGFR 02/18/2025 105     hs TnI 0hr 02/18/2025 <2     SARS COV Rapid Antigen 02/18/2025 Negative     Influenza A Rapid Antigen 02/18/2025 Negative     Influenza B Rapid Antigen 02/18/2025 Negative     EXT Preg Test, Ur 02/19/2025 Negative     Control 02/19/2025 Valid     D-Dimer, Quant 02/18/2025 <0.27        Suicide/Homicide Risk Assessment:    Risk of Harm to Self:  The following ratings are based on assessment at the time of the interview  Demographic Risk Factors include: , age: young adult (15-24)  Historical Risk Factors include: history of anxiety  Recent Specific Risk Factors include: none  Protective Factors: no current suicidal ideation, able to  make plans for the future, access to mental health treatment, compliant with medications, compliant with mental health treatment, effective problem solving skills, future oriented, having a desire to be alive, having a desire to live, having a sense of purpose or meaning in life, medical compliance, resiliency, stable living environment, stable job, sense of determination, strong relationships, supportive family, supportive friends  Weapons/Firearms: guns. The following steps have been taken to ensure weapons are properly secured: locked  Based on today's assessment, Sanam presents the following risk of harm to self: minimal    Risk of Harm to Others:  The following ratings are based on assessment at the time of the interview  Protective Factors: no current homicidal ideation  Based on today's assessment, Sanam presents the following risk of harm to others: none    The following interventions are recommended: Continue medication management. No other intervention changes indicated at this time.    Psychotherapy Provided:     Individual psychotherapy provided: Yes    Medication changes discussed with Sanam.  Medication education provided to Sanam.  Discussed with Sanam coping with everyday stressors.  Coping skills including exercising, good sleep, hanging out with friends reviewed with Sanam.   Reassurance and supportive therapy provided.   Crisis/safety plan discussed with Sanam.    Treatment Plan:    Completed and signed during the session: Not applicable - Treatment Plan not due at this session.    Goals: Progress towards Treatment Plan goals - Yes, progressing, as evidenced by subjective findings in HPI/Subjective Section and in Assessment and Plan Section    Depression Follow-up Plan Completed: Not applicable    Note Share:    This note was shared with patient.    Administrative Statements   Administrative Statements   Encounter provider MARVA Posey    The Patient is located at Home and in the following state  "in which I hold an active license [place].    The patient was identified by name and date of birth. Sanam Garner was informed that this is a telemedicine visit and that the visit is being conducted through the Epic Embedded platform. She agrees to proceed..  My office door was closed. No one else was in the room.  She acknowledged consent and understanding of privacy and security of the video platform. The patient has agreed to participate and understands they can discontinue the visit at any time.    I have spent a total time of 15 minutes in caring for this patient on the day of the visit/encounter including Risks and benefits of tx options, Instructions for management, Patient and family education, Impressions, Counseling / Coordination of care, Documenting in the medical record, and Obtaining or reviewing history  , not including the time spent for establishing the audio/video connection.    Visit Time  Visit Start Time: 1400  Visit Stop Time: 1415  Total Visit Duration: 15 minutes    Portions of the record may have been created with voice recognition software. Occasional wrong word or \"sound a like\" substitutions may have occurred due to the inherent limitations of voice recognition software. Read the chart carefully and recognize, using context, where substitutions have occurred.    MARVA Posey 07/14/25       [1]   Past Medical History:  Diagnosis Date    Acid reflux     ADHD (attention deficit hyperactivity disorder)     Anxiety     GERD (gastroesophageal reflux disease)    [2]   Past Surgical History:  Procedure Laterality Date    AUGMENTATION BREAST Right 8/15/2022    Procedure: AUGMENTATION BREAST;  Surgeon: Costa Case MD;  Location:  MAIN OR;  Service: Plastics    [place] MASTOPEXY Bilateral 8/15/2022    Procedure: MASTOPEXY;  Surgeon: Costa Case MD;  Location:  MAIN OR;  Service: Plastics    WISDOM TOOTH EXTRACTION     [3] No Known Allergies  [4]   Family History  Problem Relation Name " Age of Onset    Anxiety disorder Mother      ADD / ADHD Father      Anxiety disorder Sister

## 2025-08-01 ENCOUNTER — OFFICE VISIT (OUTPATIENT)
Age: 25
End: 2025-08-01
Payer: COMMERCIAL

## 2025-08-01 VITALS
OXYGEN SATURATION: 99 % | WEIGHT: 173.8 LBS | TEMPERATURE: 98 F | SYSTOLIC BLOOD PRESSURE: 116 MMHG | BODY MASS INDEX: 26.34 KG/M2 | HEART RATE: 75 BPM | DIASTOLIC BLOOD PRESSURE: 70 MMHG | HEIGHT: 68 IN

## 2025-08-01 DIAGNOSIS — R73.01 ELEVATED FASTING GLUCOSE: ICD-10-CM

## 2025-08-01 DIAGNOSIS — R71.8 LOW MEAN CORPUSCULAR VOLUME (MCV): ICD-10-CM

## 2025-08-01 DIAGNOSIS — Z13.228 SCREENING FOR METABOLIC DISORDER: ICD-10-CM

## 2025-08-01 DIAGNOSIS — Z00.00 ANNUAL PHYSICAL EXAM: Primary | ICD-10-CM

## 2025-08-01 DIAGNOSIS — E03.9 HYPOTHYROIDISM, UNSPECIFIED TYPE: ICD-10-CM

## 2025-08-01 DIAGNOSIS — R79.89 ABNORMAL TSH: ICD-10-CM

## 2025-08-01 DIAGNOSIS — E55.9 VITAMIN D DEFICIENCY: ICD-10-CM

## 2025-08-01 DIAGNOSIS — E78.2 MIXED HYPERLIPIDEMIA: ICD-10-CM

## 2025-08-01 DIAGNOSIS — L70.9 ACNE, UNSPECIFIED ACNE TYPE: ICD-10-CM

## 2025-08-01 PROCEDURE — 99395 PREV VISIT EST AGE 18-39: CPT | Performed by: NURSE PRACTITIONER

## 2025-08-05 ENCOUNTER — APPOINTMENT (OUTPATIENT)
Dept: LAB | Age: 25
End: 2025-08-05
Attending: NURSE PRACTITIONER
Payer: COMMERCIAL

## 2025-08-05 LAB
25(OH)D3 SERPL-MCNC: 48.1 NG/ML (ref 30–100)
ALBUMIN SERPL BCG-MCNC: 4.4 G/DL (ref 3.5–5)
ALP SERPL-CCNC: 45 U/L (ref 34–104)
ALT SERPL W P-5'-P-CCNC: 16 U/L (ref 7–52)
ANION GAP SERPL CALCULATED.3IONS-SCNC: 5 MMOL/L (ref 4–13)
AST SERPL W P-5'-P-CCNC: 20 U/L (ref 13–39)
BASOPHILS # BLD AUTO: 0.03 THOUSANDS/ÂΜL (ref 0–0.1)
BASOPHILS NFR BLD AUTO: 1 % (ref 0–1)
BILIRUB SERPL-MCNC: 0.54 MG/DL (ref 0.2–1)
BUN SERPL-MCNC: 17 MG/DL (ref 5–25)
CALCIUM SERPL-MCNC: 9.3 MG/DL (ref 8.4–10.2)
CHLORIDE SERPL-SCNC: 100 MMOL/L (ref 96–108)
CHOLEST SERPL-MCNC: 176 MG/DL (ref ?–200)
CO2 SERPL-SCNC: 32 MMOL/L (ref 21–32)
CREAT SERPL-MCNC: 0.88 MG/DL (ref 0.6–1.3)
CRP SERPL QL: 1.3 MG/L
EOSINOPHIL # BLD AUTO: 0.1 THOUSAND/ÂΜL (ref 0–0.61)
EOSINOPHIL NFR BLD AUTO: 2 % (ref 0–6)
ERYTHROCYTE [DISTWIDTH] IN BLOOD BY AUTOMATED COUNT: 14.6 % (ref 11.6–15.1)
ERYTHROCYTE [SEDIMENTATION RATE] IN BLOOD: 7 MM/HOUR (ref 0–19)
FERRITIN SERPL-MCNC: 22 NG/ML (ref 30–307)
GFR SERPL CREATININE-BSD FRML MDRD: 92 ML/MIN/1.73SQ M
GLUCOSE P FAST SERPL-MCNC: 83 MG/DL (ref 65–99)
HCT VFR BLD AUTO: 41.9 % (ref 34.8–46.1)
HDLC SERPL-MCNC: 67 MG/DL
HGB BLD-MCNC: 13.5 G/DL (ref 11.5–15.4)
IGA SERPL-MCNC: 226 MG/DL (ref 66–433)
IMM GRANULOCYTES # BLD AUTO: 0.03 THOUSAND/UL (ref 0–0.2)
IMM GRANULOCYTES NFR BLD AUTO: 1 % (ref 0–2)
IRON SATN MFR SERPL: 28 % (ref 15–50)
IRON SERPL-MCNC: 111 UG/DL (ref 50–212)
LDLC SERPL CALC-MCNC: 100 MG/DL (ref 0–100)
LYMPHOCYTES # BLD AUTO: 1.34 THOUSANDS/ÂΜL (ref 0.6–4.47)
LYMPHOCYTES NFR BLD AUTO: 27 % (ref 14–44)
MCH RBC QN AUTO: 28.7 PG (ref 26.8–34.3)
MCHC RBC AUTO-ENTMCNC: 32.2 G/DL (ref 31.4–37.4)
MCV RBC AUTO: 89 FL (ref 82–98)
MONOCYTES # BLD AUTO: 0.37 THOUSAND/ÂΜL (ref 0.17–1.22)
MONOCYTES NFR BLD AUTO: 7 % (ref 4–12)
NEUTROPHILS # BLD AUTO: 3.18 THOUSANDS/ÂΜL (ref 1.85–7.62)
NEUTS SEG NFR BLD AUTO: 62 % (ref 43–75)
NONHDLC SERPL-MCNC: 109 MG/DL
NRBC BLD AUTO-RTO: 0 /100 WBCS
PLATELET # BLD AUTO: 147 THOUSANDS/UL (ref 149–390)
PMV BLD AUTO: 11.9 FL (ref 8.9–12.7)
POTASSIUM SERPL-SCNC: 4 MMOL/L (ref 3.5–5.3)
PROT SERPL-MCNC: 6.7 G/DL (ref 6.4–8.4)
RBC # BLD AUTO: 4.71 MILLION/UL (ref 3.81–5.12)
SODIUM SERPL-SCNC: 137 MMOL/L (ref 135–147)
TIBC SERPL-MCNC: 399 UG/DL (ref 250–450)
TRANSFERRIN SERPL-MCNC: 285 MG/DL (ref 203–362)
TRIGL SERPL-MCNC: 45 MG/DL (ref ?–150)
TSH SERPL DL<=0.05 MIU/L-ACNC: 0.84 UIU/ML (ref 0.45–4.5)
UIBC SERPL-MCNC: 288 UG/DL (ref 155–355)
VIT B12 SERPL-MCNC: 447 PG/ML (ref 180–914)
WBC # BLD AUTO: 5.05 THOUSAND/UL (ref 4.31–10.16)

## 2025-08-05 PROCEDURE — 85652 RBC SED RATE AUTOMATED: CPT | Performed by: NURSE PRACTITIONER

## 2025-08-05 PROCEDURE — 82607 VITAMIN B-12: CPT | Performed by: NURSE PRACTITIONER

## 2025-08-05 PROCEDURE — 82728 ASSAY OF FERRITIN: CPT | Performed by: NURSE PRACTITIONER

## 2025-08-05 PROCEDURE — 83540 ASSAY OF IRON: CPT | Performed by: NURSE PRACTITIONER

## 2025-08-05 PROCEDURE — 36415 COLL VENOUS BLD VENIPUNCTURE: CPT | Performed by: NURSE PRACTITIONER

## 2025-08-05 PROCEDURE — 83550 IRON BINDING TEST: CPT | Performed by: NURSE PRACTITIONER

## 2025-08-05 PROCEDURE — 82306 VITAMIN D 25 HYDROXY: CPT | Performed by: NURSE PRACTITIONER

## 2025-08-05 PROCEDURE — 84443 ASSAY THYROID STIM HORMONE: CPT | Performed by: NURSE PRACTITIONER

## 2025-08-05 PROCEDURE — 86140 C-REACTIVE PROTEIN: CPT | Performed by: NURSE PRACTITIONER

## 2025-08-06 DIAGNOSIS — E61.1 IRON DEFICIENCY: Primary | ICD-10-CM

## 2025-08-06 LAB
EST. AVERAGE GLUCOSE BLD GHB EST-MCNC: 114 MG/DL
GLIADIN IGG SER IA-ACNC: <1.4 U/ML (ref ?–10)
GLIADIN PEPTIDE IGA SER IA-ACNC: 0.6 U/ML (ref ?–10)
HBA1C MFR BLD: 5.6 %
TTG IGA SER IA-ACNC: <0.4 U/ML (ref ?–10)
TTG IGG SER IA-ACNC: <1.7 U/ML (ref ?–10)

## 2025-08-06 RX ORDER — MULTIVIT WITH MINERALS/LUTEIN
1000 TABLET ORAL DAILY
Qty: 90 TABLET | Refills: 1 | Status: SHIPPED | OUTPATIENT
Start: 2025-08-06

## 2025-08-06 RX ORDER — FERROUS SULFATE 324(65)MG
324 TABLET, DELAYED RELEASE (ENTERIC COATED) ORAL
Qty: 90 TABLET | Refills: 1 | Status: SHIPPED | OUTPATIENT
Start: 2025-08-06

## (undated) DEVICE — NEEDLE 25G X 1 1/2

## (undated) DEVICE — SUT VICRYL 3-0 SH 27 IN J416H

## (undated) DEVICE — SYRINGE 30ML LL

## (undated) DEVICE — STERILE POLYISOPRENE POWDER-FREE SURGICAL GLOVES: Brand: PROTEXIS

## (undated) DEVICE — NEEDLE BLUNT 18 G X 1 1/2IN

## (undated) DEVICE — POV-IOD SOLUTION 4OZ BT

## (undated) DEVICE — LIGHT GLOVE GREEN

## (undated) DEVICE — SUT PDS II 3-0 SH 27 IN Z316H

## (undated) DEVICE — CHEST/BREAST DRAPE: Brand: CONVERTORS

## (undated) DEVICE — BASIC PACK: Brand: CONVERTORS

## (undated) DEVICE — SCD SEQUENTIAL COMPRESSION COMFORT SLEEVE MEDIUM KNEE LENGTH: Brand: KENDALL SCD

## (undated) DEVICE — SUT MONOCRYL 3-0 PS-2 27 IN Y427H

## (undated) DEVICE — SINGLE PORT MANIFOLD: Brand: NEPTUNE 2

## (undated) DEVICE — SPONGE LAP 18 X 18 IN STRL RFD

## (undated) DEVICE — SUT VICRYL 4-0 P-3 18 IN J494G

## (undated) DEVICE — 1820 FOAM BLOCK NEEDLE COUNTER: Brand: DEVON

## (undated) DEVICE — PENCIL ELECTROSURG E-Z CLEAN -0035H

## (undated) DEVICE — ELECTRODE BLADE MOD E-Z CLEAN 2.5IN 6.4CM -0012M

## (undated) DEVICE — ADHESIVE SKIN HIGH VISCOSITY EXOFIN 1ML

## (undated) DEVICE — SUPER SPONGES,MEDIUM: Brand: KERLIX

## (undated) DEVICE — 3M™ STERI-STRIP™ REINFORCED ADHESIVE SKIN CLOSURES, R1547, 1/2 IN X 4 IN (12 MM X 100 MM), 6 STRIPS/ENVELOPE: Brand: 3M™ STERI-STRIP™

## (undated) DEVICE — SYRINGE 10ML LL

## (undated) DEVICE — DISPOSABLE OR TOWEL: Brand: CARDINAL HEALTH

## (undated) DEVICE — BULB SYRINGE,IRRIGATION WITH PROTECTIVE CAP: Brand: DOVER

## (undated) DEVICE — UNDYED MONOFILAMENT (POLYDIOXANONE), ABSORBABLE SURGICAL SUTURE: Brand: PDS

## (undated) DEVICE — SWABSTCK, BENZOIN TINCTURE, 1/PK, STRL: Brand: APLICARE

## (undated) DEVICE — WET SKIN PREP TRAY: Brand: MEDLINE INDUSTRIES, INC.

## (undated) DEVICE — SKIN MARKER DUAL TIP WITH RULER CAP, FLEXIBLE RULER AND LABELS: Brand: DEVON

## (undated) DEVICE — STANDARD SURGICAL GOWN, L: Brand: CONVERTORS

## (undated) DEVICE — TUBING SUCTION 5MM X 12 FT

## (undated) DEVICE — SUT VICRYL 2-0 CT-1 27 IN J259H

## (undated) DEVICE — INTENDED FOR TISSUE SEPARATION, AND OTHER PROCEDURES THAT REQUIRE A SHARP SURGICAL BLADE TO PUNCTURE OR CUT.: Brand: BARD-PARKER ® SAFETYLOCK CARBON RIB-BACK BLADES